# Patient Record
Sex: MALE | Race: WHITE | NOT HISPANIC OR LATINO | Employment: OTHER | URBAN - METROPOLITAN AREA
[De-identification: names, ages, dates, MRNs, and addresses within clinical notes are randomized per-mention and may not be internally consistent; named-entity substitution may affect disease eponyms.]

---

## 2023-01-01 ENCOUNTER — LAB (OUTPATIENT)
Dept: LAB | Facility: LAB | Age: 68
End: 2023-01-01
Payer: MEDICARE

## 2023-01-01 ENCOUNTER — TELEPHONE (OUTPATIENT)
Dept: PRIMARY CARE | Facility: CLINIC | Age: 68
End: 2023-01-01
Payer: MEDICARE

## 2023-01-01 ENCOUNTER — APPOINTMENT (OUTPATIENT)
Dept: RADIOLOGY | Facility: HOSPITAL | Age: 68
DRG: 698 | End: 2023-01-01
Payer: MEDICARE

## 2023-01-01 ENCOUNTER — TELEPHONE (OUTPATIENT)
Dept: SURGICAL ONCOLOGY | Facility: HOSPITAL | Age: 68
End: 2023-01-01
Payer: MEDICARE

## 2023-01-01 ENCOUNTER — APPOINTMENT (OUTPATIENT)
Dept: HEMATOLOGY/ONCOLOGY | Facility: CLINIC | Age: 68
End: 2023-01-01
Payer: MEDICARE

## 2023-01-01 ENCOUNTER — PATIENT OUTREACH (OUTPATIENT)
Dept: PRIMARY CARE | Facility: CLINIC | Age: 68
End: 2023-01-01
Payer: MEDICARE

## 2023-01-01 ENCOUNTER — OFFICE VISIT (OUTPATIENT)
Dept: PRIMARY CARE | Facility: CLINIC | Age: 68
End: 2023-01-01
Payer: MEDICARE

## 2023-01-01 ENCOUNTER — HOSPITAL ENCOUNTER (INPATIENT)
Facility: HOSPITAL | Age: 68
LOS: 5 days | DRG: 698 | End: 2023-12-07
Attending: EMERGENCY MEDICINE | Admitting: INTERNAL MEDICINE
Payer: MEDICARE

## 2023-01-01 ENCOUNTER — OFFICE VISIT (OUTPATIENT)
Dept: HEMATOLOGY/ONCOLOGY | Facility: CLINIC | Age: 68
End: 2023-01-01
Payer: MEDICARE

## 2023-01-01 ENCOUNTER — TELEPHONE (OUTPATIENT)
Dept: SCHEDULING | Age: 68
End: 2023-01-01
Payer: MEDICARE

## 2023-01-01 ENCOUNTER — TELEPHONE (OUTPATIENT)
Dept: HEMATOLOGY/ONCOLOGY | Facility: HOSPITAL | Age: 68
End: 2023-01-01
Payer: MEDICARE

## 2023-01-01 VITALS
HEART RATE: 68 BPM | DIASTOLIC BLOOD PRESSURE: 68 MMHG | BODY MASS INDEX: 37.02 KG/M2 | SYSTOLIC BLOOD PRESSURE: 112 MMHG | WEIGHT: 304 LBS | HEIGHT: 76 IN

## 2023-01-01 VITALS
OXYGEN SATURATION: 100 % | DIASTOLIC BLOOD PRESSURE: 55 MMHG | TEMPERATURE: 96.6 F | BODY MASS INDEX: 27.2 KG/M2 | SYSTOLIC BLOOD PRESSURE: 87 MMHG | WEIGHT: 223.33 LBS | HEIGHT: 76 IN | RESPIRATION RATE: 24 BRPM | HEART RATE: 98 BPM

## 2023-01-01 DIAGNOSIS — F32.A DEPRESSIVE DISORDER: ICD-10-CM

## 2023-01-01 DIAGNOSIS — C43.9 METASTATIC MELANOMA (MULTI): ICD-10-CM

## 2023-01-01 DIAGNOSIS — C77.9 IN-TRANSIT METASTASIS FROM MALIGNANT MELANOMA OF SKIN (MULTI): ICD-10-CM

## 2023-01-01 DIAGNOSIS — C77.9 MALIGNANT MELANOMA METASTATIC TO LYMPH NODE (MULTI): Primary | ICD-10-CM

## 2023-01-01 DIAGNOSIS — A41.9 SEPSIS WITHOUT ACUTE ORGAN DYSFUNCTION, DUE TO UNSPECIFIED ORGANISM (MULTI): ICD-10-CM

## 2023-01-01 DIAGNOSIS — E55.9 VITAMIN D DEFICIENCY: ICD-10-CM

## 2023-01-01 DIAGNOSIS — N18.32 HYPERTENSIVE HEART AND KIDNEY DISEASE WITH CHRONIC COMBINED SYSTOLIC AND DIASTOLIC CONGESTIVE HEART FAILURE AND STAGE 3B CHRONIC KIDNEY DISEASE (MULTI): ICD-10-CM

## 2023-01-01 DIAGNOSIS — C79.31 MELANOMA METASTATIC TO BRAIN (MULTI): ICD-10-CM

## 2023-01-01 DIAGNOSIS — I13.0 HYPERTENSIVE HEART AND KIDNEY DISEASE WITH CHRONIC COMBINED SYSTOLIC AND DIASTOLIC CONGESTIVE HEART FAILURE AND STAGE 3B CHRONIC KIDNEY DISEASE (MULTI): ICD-10-CM

## 2023-01-01 DIAGNOSIS — C43.9 IN-TRANSIT METASTASIS FROM MALIGNANT MELANOMA OF SKIN (MULTI): ICD-10-CM

## 2023-01-01 DIAGNOSIS — E11.65 TYPE 2 DIABETES MELLITUS WITH HYPERGLYCEMIA, WITH LONG-TERM CURRENT USE OF INSULIN (MULTI): ICD-10-CM

## 2023-01-01 DIAGNOSIS — I20.89 CHRONIC STABLE ANGINA (CMS-HCC): ICD-10-CM

## 2023-01-01 DIAGNOSIS — D51.0 VITAMIN B12 DEFICIENCY ANEMIA DUE TO INTRINSIC FACTOR DEFICIENCY: ICD-10-CM

## 2023-01-01 DIAGNOSIS — N39.0 URINARY TRACT INFECTION WITHOUT HEMATURIA, SITE UNSPECIFIED: Primary | ICD-10-CM

## 2023-01-01 DIAGNOSIS — Z79.4 TYPE 2 DIABETES MELLITUS WITH HYPERGLYCEMIA, WITH LONG-TERM CURRENT USE OF INSULIN (MULTI): ICD-10-CM

## 2023-01-01 DIAGNOSIS — I20.89 CHRONIC STABLE ANGINA (CMS-HCC): Primary | ICD-10-CM

## 2023-01-01 DIAGNOSIS — E78.2 MIXED HYPERLIPIDEMIA: ICD-10-CM

## 2023-01-01 DIAGNOSIS — E11.65 TYPE 2 DIABETES MELLITUS WITH HYPERGLYCEMIA, WITH LONG-TERM CURRENT USE OF INSULIN (MULTI): Primary | ICD-10-CM

## 2023-01-01 DIAGNOSIS — I50.42 HYPERTENSIVE HEART AND KIDNEY DISEASE WITH CHRONIC COMBINED SYSTOLIC AND DIASTOLIC CONGESTIVE HEART FAILURE AND STAGE 3B CHRONIC KIDNEY DISEASE (MULTI): ICD-10-CM

## 2023-01-01 DIAGNOSIS — Z79.4 TYPE 2 DIABETES MELLITUS WITH HYPERGLYCEMIA, WITH LONG-TERM CURRENT USE OF INSULIN (MULTI): Primary | ICD-10-CM

## 2023-01-01 DIAGNOSIS — I10 ESSENTIAL HYPERTENSION, BENIGN: ICD-10-CM

## 2023-01-01 LAB
ALBUMIN SERPL BCP-MCNC: 3.2 G/DL (ref 3.4–5)
ALP SERPL-CCNC: 539 U/L (ref 33–136)
ALT SERPL W P-5'-P-CCNC: 22 U/L (ref 10–52)
AMORPH CRY #/AREA UR COMP ASSIST: ABNORMAL /HPF
ANION GAP BLDMV CALCULATED.4IONS-SCNC: 16 MMO/L (ref 10–25)
ANION GAP SERPL CALC-SCNC: 15 MMOL/L (ref 10–20)
ANION GAP SERPL CALC-SCNC: 16 MMOL/L (ref 10–20)
ANION GAP SERPL CALC-SCNC: 17 MMOL/L (ref 10–20)
APPEARANCE UR: ABNORMAL
APPEARANCE UR: ABNORMAL
APTT PPP: 28 SECONDS (ref 27–38)
AST SERPL W P-5'-P-CCNC: 23 U/L (ref 9–39)
BACTERIA #/AREA URNS AUTO: ABNORMAL /HPF
BACTERIA #/AREA URNS AUTO: ABNORMAL /HPF
BACTERIA BLD CULT: NORMAL
BACTERIA BLD CULT: NORMAL
BACTERIA UR CULT: NO GROWTH
BACTERIA UR CULT: NO GROWTH
BASE EXCESS BLDMV CALC-SCNC: -3.2 MMOL/L (ref -2–3)
BASOPHILS # BLD AUTO: 0.02 X10*3/UL (ref 0–0.1)
BASOPHILS NFR BLD AUTO: 0.3 %
BILIRUB SERPL-MCNC: 1 MG/DL (ref 0–1.2)
BILIRUB UR STRIP.AUTO-MCNC: ABNORMAL MG/DL
BILIRUB UR STRIP.AUTO-MCNC: ABNORMAL MG/DL
BODY TEMPERATURE: 37 DEGREES CELSIUS
BUN SERPL-MCNC: 32 MG/DL (ref 6–23)
BUN SERPL-MCNC: 41 MG/DL (ref 6–23)
BUN SERPL-MCNC: 54 MG/DL (ref 6–23)
CA-I BLDMV-SCNC: 1.16 MMOL/L (ref 1.1–1.33)
CALCIDIOL (25 OH VITAMIN D3) (NG/ML) IN SER/PLAS: 25 NG/ML
CALCIUM SERPL-MCNC: 7.5 MG/DL (ref 8.6–10.3)
CALCIUM SERPL-MCNC: 7.7 MG/DL (ref 8.6–10.3)
CALCIUM SERPL-MCNC: 8.1 MG/DL (ref 8.6–10.3)
CARDIAC TROPONIN I PNL SERPL HS: 22 NG/L (ref 0–20)
CARDIAC TROPONIN I PNL SERPL HS: 24 NG/L (ref 0–20)
CHLORIDE BLD-SCNC: 110 MMOL/L (ref 98–107)
CHLORIDE SERPL-SCNC: 111 MMOL/L (ref 98–107)
CHLORIDE SERPL-SCNC: 115 MMOL/L (ref 98–107)
CHLORIDE SERPL-SCNC: 115 MMOL/L (ref 98–107)
CHOLESTEROL (MG/DL) IN SER/PLAS: 118 MG/DL (ref 0–199)
CHOLESTEROL IN HDL (MG/DL) IN SER/PLAS: 47.7 MG/DL
CHOLESTEROL/HDL RATIO: 2.5
CO2 SERPL-SCNC: 17 MMOL/L (ref 21–32)
CO2 SERPL-SCNC: 18 MMOL/L (ref 21–32)
CO2 SERPL-SCNC: 20 MMOL/L (ref 21–32)
COBALAMIN (VITAMIN B12) (PG/ML) IN SER/PLAS: 851 PG/ML (ref 211–911)
COLOR UR: ABNORMAL
COLOR UR: ABNORMAL
CREAT SERPL-MCNC: 1.12 MG/DL (ref 0.5–1.3)
CREAT SERPL-MCNC: 1.12 MG/DL (ref 0.5–1.3)
CREAT SERPL-MCNC: 1.15 MG/DL (ref 0.5–1.3)
CREAT SERPL-MCNC: 2 MG/DL (ref 0.5–1.3)
DACRYOCYTES BLD QL SMEAR: NORMAL
EOSINOPHIL # BLD AUTO: 0.03 X10*3/UL (ref 0–0.7)
EOSINOPHIL NFR BLD AUTO: 0.4 %
ERYTHROCYTE [DISTWIDTH] IN BLOOD BY AUTOMATED COUNT: 22.8 % (ref 11.5–14.5)
ERYTHROCYTE [DISTWIDTH] IN BLOOD BY AUTOMATED COUNT: 23.5 % (ref 11.5–14.5)
ERYTHROCYTE [DISTWIDTH] IN BLOOD BY AUTOMATED COUNT: 23.6 % (ref 11.5–14.5)
ESTIMATED AVERAGE GLUCOSE FOR HBA1C: 180 MG/DL
GFR SERPL CREATININE-BSD FRML MDRD: 35 ML/MIN/1.73M*2
GFR SERPL CREATININE-BSD FRML MDRD: 68 ML/MIN/1.73M*2
GFR SERPL CREATININE-BSD FRML MDRD: 71 ML/MIN/1.73M*2
GFR SERPL CREATININE-BSD FRML MDRD: 71 ML/MIN/1.73M*2
GLUCOSE BLD MANUAL STRIP-MCNC: 249 MG/DL (ref 74–99)
GLUCOSE BLD MANUAL STRIP-MCNC: 263 MG/DL (ref 74–99)
GLUCOSE BLD MANUAL STRIP-MCNC: 265 MG/DL (ref 74–99)
GLUCOSE BLD MANUAL STRIP-MCNC: 268 MG/DL (ref 74–99)
GLUCOSE BLD MANUAL STRIP-MCNC: 277 MG/DL (ref 74–99)
GLUCOSE BLD MANUAL STRIP-MCNC: 279 MG/DL (ref 74–99)
GLUCOSE BLD MANUAL STRIP-MCNC: 283 MG/DL (ref 74–99)
GLUCOSE BLD MANUAL STRIP-MCNC: 289 MG/DL (ref 74–99)
GLUCOSE BLD MANUAL STRIP-MCNC: 304 MG/DL (ref 74–99)
GLUCOSE BLD MANUAL STRIP-MCNC: 305 MG/DL (ref 74–99)
GLUCOSE BLD MANUAL STRIP-MCNC: 325 MG/DL (ref 74–99)
GLUCOSE BLD MANUAL STRIP-MCNC: 326 MG/DL (ref 74–99)
GLUCOSE BLD MANUAL STRIP-MCNC: 333 MG/DL (ref 74–99)
GLUCOSE BLD MANUAL STRIP-MCNC: 340 MG/DL (ref 74–99)
GLUCOSE BLD-MCNC: 301 MG/DL (ref 74–99)
GLUCOSE SERPL-MCNC: 264 MG/DL (ref 74–99)
GLUCOSE SERPL-MCNC: 275 MG/DL (ref 74–99)
GLUCOSE SERPL-MCNC: 369 MG/DL (ref 74–99)
GLUCOSE UR STRIP.AUTO-MCNC: ABNORMAL MG/DL
GLUCOSE UR STRIP.AUTO-MCNC: ABNORMAL MG/DL
HCO3 BLDMV-SCNC: 20.6 MMOL/L (ref 22–26)
HCT VFR BLD AUTO: 24.7 % (ref 41–52)
HCT VFR BLD AUTO: 26.6 % (ref 41–52)
HCT VFR BLD AUTO: 29 % (ref 41–52)
HCT VFR BLD EST: 26 % (ref 41–52)
HEMOGLOBIN A1C/HEMOGLOBIN TOTAL IN BLOOD: 7.9 %
HGB BLD-MCNC: 7.4 G/DL (ref 13.5–17.5)
HGB BLD-MCNC: 8 G/DL (ref 13.5–17.5)
HGB BLD-MCNC: 8.9 G/DL (ref 13.5–17.5)
HGB BLDMV-MCNC: 8.5 G/DL (ref 13.5–17.5)
HOLD SPECIMEN: NORMAL
HOLD SPECIMEN: NORMAL
HYALINE CASTS #/AREA URNS AUTO: ABNORMAL /LPF
HYALINE CASTS #/AREA URNS AUTO: ABNORMAL /LPF
HYPOCHROMIA BLD QL SMEAR: NORMAL
IMM GRANULOCYTES # BLD AUTO: 0.36 X10*3/UL (ref 0–0.7)
IMM GRANULOCYTES NFR BLD AUTO: 5 % (ref 0–0.9)
INHALED O2 CONCENTRATION: 24 %
INR PPP: 1.1 (ref 0.9–1.1)
KETONES UR STRIP.AUTO-MCNC: ABNORMAL MG/DL
KETONES UR STRIP.AUTO-MCNC: ABNORMAL MG/DL
LACTATE BLDMV-SCNC: 2.5 MMOL/L (ref 0.4–2)
LACTATE BLDV-SCNC: 1.8 MMOL/L (ref 0.4–2)
LACTATE SERPL-SCNC: 1.6 MMOL/L (ref 0.4–2)
LACTATE SERPL-SCNC: 2.2 MMOL/L (ref 0.4–2)
LDL: 33 MG/DL (ref 0–99)
LEUKOCYTE ESTERASE UR QL STRIP.AUTO: ABNORMAL
LEUKOCYTE ESTERASE UR QL STRIP.AUTO: ABNORMAL
LYMPHOCYTES # BLD AUTO: 1.12 X10*3/UL (ref 1.2–4.8)
LYMPHOCYTES NFR BLD AUTO: 15.5 %
MAGNESIUM SERPL-MCNC: 1.53 MG/DL (ref 1.6–2.4)
MAGNESIUM SERPL-MCNC: 1.76 MG/DL (ref 1.6–2.4)
MCH RBC QN AUTO: 34 PG (ref 26–34)
MCH RBC QN AUTO: 34.4 PG (ref 26–34)
MCH RBC QN AUTO: 35.1 PG (ref 26–34)
MCHC RBC AUTO-ENTMCNC: 30 G/DL (ref 32–36)
MCHC RBC AUTO-ENTMCNC: 30.1 G/DL (ref 32–36)
MCHC RBC AUTO-ENTMCNC: 30.7 G/DL (ref 32–36)
MCV RBC AUTO: 111 FL (ref 80–100)
MCV RBC AUTO: 115 FL (ref 80–100)
MCV RBC AUTO: 117 FL (ref 80–100)
MONOCYTES # BLD AUTO: 0.78 X10*3/UL (ref 0.1–1)
MONOCYTES NFR BLD AUTO: 10.8 %
MUCOUS THREADS #/AREA URNS AUTO: ABNORMAL /LPF
MUCOUS THREADS #/AREA URNS AUTO: ABNORMAL /LPF
NEUTROPHILS # BLD AUTO: 4.93 X10*3/UL (ref 1.2–7.7)
NEUTROPHILS NFR BLD AUTO: 68 %
NITRITE UR QL STRIP.AUTO: NEGATIVE
NITRITE UR QL STRIP.AUTO: NEGATIVE
NRBC BLD-RTO: 0 /100 WBCS (ref 0–0)
NRBC BLD-RTO: 0.2 /100 WBCS (ref 0–0)
NRBC BLD-RTO: 0.3 /100 WBCS (ref 0–0)
OXYHGB MFR BLDMV: 46.7 % (ref 45–75)
PATH REVIEW-CBC DIFFERENTIAL: NORMAL
PCO2 BLDMV: 31 MM HG (ref 41–51)
PH BLDMV: 7.43 PH (ref 7.33–7.43)
PH UR STRIP.AUTO: 5 [PH]
PH UR STRIP.AUTO: 5.5 [PH]
PLATELET # BLD AUTO: 40 X10*3/UL (ref 150–450)
PLATELET # BLD AUTO: 42 X10*3/UL (ref 150–450)
PLATELET # BLD AUTO: 46 X10*3/UL (ref 150–450)
PO2 BLDMV: 38 MM HG (ref 35–45)
POTASSIUM BLDMV-SCNC: 4.3 MMOL/L (ref 3.5–5.3)
POTASSIUM SERPL-SCNC: 4.2 MMOL/L (ref 3.5–5.3)
POTASSIUM SERPL-SCNC: 4.5 MMOL/L (ref 3.5–5.3)
POTASSIUM SERPL-SCNC: 4.9 MMOL/L (ref 3.5–5.3)
PROT SERPL-MCNC: 5.2 G/DL (ref 6.4–8.2)
PROT UR STRIP.AUTO-MCNC: ABNORMAL MG/DL
PROT UR STRIP.AUTO-MCNC: ABNORMAL MG/DL
PROTHROMBIN TIME: 12.7 SECONDS (ref 9.8–12.8)
RBC # BLD AUTO: 2.15 X10*6/UL (ref 4.5–5.9)
RBC # BLD AUTO: 2.28 X10*6/UL (ref 4.5–5.9)
RBC # BLD AUTO: 2.62 X10*6/UL (ref 4.5–5.9)
RBC # UR STRIP.AUTO: ABNORMAL /UL
RBC # UR STRIP.AUTO: ABNORMAL /UL
RBC #/AREA URNS AUTO: >20 /HPF
RBC #/AREA URNS AUTO: ABNORMAL /HPF
RBC MORPH BLD: NORMAL
SAO2 % BLDMV: 48 % (ref 45–75)
SODIUM BLDMV-SCNC: 142 MMOL/L (ref 136–145)
SODIUM SERPL-SCNC: 142 MMOL/L (ref 136–145)
SODIUM SERPL-SCNC: 144 MMOL/L (ref 136–145)
SODIUM SERPL-SCNC: 144 MMOL/L (ref 136–145)
SP GR UR STRIP.AUTO: 1.02
SP GR UR STRIP.AUTO: >1.03
SQUAMOUS #/AREA URNS AUTO: ABNORMAL /HPF
TRIGLYCERIDE (MG/DL) IN SER/PLAS: 187 MG/DL (ref 0–149)
URATE CRY #/AREA UR COMP ASSIST: ABNORMAL /HPF
UROBILINOGEN UR STRIP.AUTO-MCNC: 4 MG/DL
UROBILINOGEN UR STRIP.AUTO-MCNC: ABNORMAL MG/DL
VLDL: 37 MG/DL (ref 0–40)
WBC # BLD AUTO: 7.2 X10*3/UL (ref 4.4–11.3)
WBC # BLD AUTO: 7.4 X10*3/UL (ref 4.4–11.3)
WBC # BLD AUTO: 9 X10*3/UL (ref 4.4–11.3)
WBC #/AREA URNS AUTO: ABNORMAL /HPF
WBC #/AREA URNS AUTO: ABNORMAL /HPF
WBC CLUMPS #/AREA URNS AUTO: ABNORMAL /HPF

## 2023-01-01 PROCEDURE — 94660 CPAP INITIATION&MGMT: CPT

## 2023-01-01 PROCEDURE — 2500000004 HC RX 250 GENERAL PHARMACY W/ HCPCS (ALT 636 FOR OP/ED): Performed by: EMERGENCY MEDICINE

## 2023-01-01 PROCEDURE — 87086 URINE CULTURE/COLONY COUNT: CPT | Mod: PORLAB | Performed by: EMERGENCY MEDICINE

## 2023-01-01 PROCEDURE — 82947 ASSAY GLUCOSE BLOOD QUANT: CPT

## 2023-01-01 PROCEDURE — 3066F NEPHROPATHY DOC TX: CPT | Performed by: INTERNAL MEDICINE

## 2023-01-01 PROCEDURE — 85025 COMPLETE CBC W/AUTO DIFF WBC: CPT | Performed by: EMERGENCY MEDICINE

## 2023-01-01 PROCEDURE — 2500000005 HC RX 250 GENERAL PHARMACY W/O HCPCS: Performed by: INTERNAL MEDICINE

## 2023-01-01 PROCEDURE — 99239 HOSP IP/OBS DSCHRG MGMT >30: CPT | Performed by: INTERNAL MEDICINE

## 2023-01-01 PROCEDURE — 1160F RVW MEDS BY RX/DR IN RCRD: CPT | Performed by: INTERNAL MEDICINE

## 2023-01-01 PROCEDURE — 83036 HEMOGLOBIN GLYCOSYLATED A1C: CPT

## 2023-01-01 PROCEDURE — 2500000004 HC RX 250 GENERAL PHARMACY W/ HCPCS (ALT 636 FOR OP/ED): Performed by: INTERNAL MEDICINE

## 2023-01-01 PROCEDURE — 99214 OFFICE O/P EST MOD 30 MIN: CPT | Performed by: INTERNAL MEDICINE

## 2023-01-01 PROCEDURE — 85060 BLOOD SMEAR INTERPRETATION: CPT | Performed by: EMERGENCY MEDICINE

## 2023-01-01 PROCEDURE — 82306 VITAMIN D 25 HYDROXY: CPT

## 2023-01-01 PROCEDURE — 96372 THER/PROPH/DIAG INJ SC/IM: CPT | Performed by: INTERNAL MEDICINE

## 2023-01-01 PROCEDURE — 96372 THER/PROPH/DIAG INJ SC/IM: CPT | Mod: 25

## 2023-01-01 PROCEDURE — 81001 URINALYSIS AUTO W/SCOPE: CPT | Performed by: EMERGENCY MEDICINE

## 2023-01-01 PROCEDURE — 3008F BODY MASS INDEX DOCD: CPT | Performed by: INTERNAL MEDICINE

## 2023-01-01 PROCEDURE — 74177 CT ABD & PELVIS W/CONTRAST: CPT | Mod: FR

## 2023-01-01 PROCEDURE — 3078F DIAST BP <80 MM HG: CPT | Performed by: INTERNAL MEDICINE

## 2023-01-01 PROCEDURE — 1159F MED LIST DOCD IN RCRD: CPT | Performed by: INTERNAL MEDICINE

## 2023-01-01 PROCEDURE — 94760 N-INVAS EAR/PLS OXIMETRY 1: CPT

## 2023-01-01 PROCEDURE — 99233 SBSQ HOSP IP/OBS HIGH 50: CPT | Performed by: UROLOGY

## 2023-01-01 PROCEDURE — 99233 SBSQ HOSP IP/OBS HIGH 50: CPT | Performed by: INTERNAL MEDICINE

## 2023-01-01 PROCEDURE — 82330 ASSAY OF CALCIUM: CPT | Performed by: EMERGENCY MEDICINE

## 2023-01-01 PROCEDURE — 84484 ASSAY OF TROPONIN QUANT: CPT | Performed by: EMERGENCY MEDICINE

## 2023-01-01 PROCEDURE — 83735 ASSAY OF MAGNESIUM: CPT | Performed by: INTERNAL MEDICINE

## 2023-01-01 PROCEDURE — 96375 TX/PRO/DX INJ NEW DRUG ADDON: CPT

## 2023-01-01 PROCEDURE — 87040 BLOOD CULTURE FOR BACTERIA: CPT | Mod: PORLAB | Performed by: EMERGENCY MEDICINE

## 2023-01-01 PROCEDURE — 85027 COMPLETE CBC AUTOMATED: CPT | Performed by: INTERNAL MEDICINE

## 2023-01-01 PROCEDURE — 2060000001 HC INTERMEDIATE ICU ROOM DAILY

## 2023-01-01 PROCEDURE — 83605 ASSAY OF LACTIC ACID: CPT | Performed by: EMERGENCY MEDICINE

## 2023-01-01 PROCEDURE — 96365 THER/PROPH/DIAG IV INF INIT: CPT

## 2023-01-01 PROCEDURE — 99285 EMERGENCY DEPT VISIT HI MDM: CPT | Mod: 25 | Performed by: EMERGENCY MEDICINE

## 2023-01-01 PROCEDURE — 85610 PROTHROMBIN TIME: CPT | Performed by: INTERNAL MEDICINE

## 2023-01-01 PROCEDURE — 82565 ASSAY OF CREATININE: CPT | Performed by: EMERGENCY MEDICINE

## 2023-01-01 PROCEDURE — 71275 CT ANGIOGRAPHY CHEST: CPT | Mod: FOREIGN READ | Performed by: RADIOLOGY

## 2023-01-01 PROCEDURE — 99223 1ST HOSP IP/OBS HIGH 75: CPT | Performed by: INTERNAL MEDICINE

## 2023-01-01 PROCEDURE — 99222 1ST HOSP IP/OBS MODERATE 55: CPT | Performed by: INTERNAL MEDICINE

## 2023-01-01 PROCEDURE — 1036F TOBACCO NON-USER: CPT | Performed by: INTERNAL MEDICINE

## 2023-01-01 PROCEDURE — 2550000001 HC RX 255 CONTRASTS: Performed by: EMERGENCY MEDICINE

## 2023-01-01 PROCEDURE — 82607 VITAMIN B-12: CPT

## 2023-01-01 PROCEDURE — 36415 COLL VENOUS BLD VENIPUNCTURE: CPT

## 2023-01-01 PROCEDURE — 1158F ADVNC CARE PLAN TLK DOCD: CPT | Performed by: INTERNAL MEDICINE

## 2023-01-01 PROCEDURE — 80061 LIPID PANEL: CPT

## 2023-01-01 PROCEDURE — 2500000001 HC RX 250 WO HCPCS SELF ADMINISTERED DRUGS (ALT 637 FOR MEDICARE OP): Performed by: INTERNAL MEDICINE

## 2023-01-01 PROCEDURE — 2500000001 HC RX 250 WO HCPCS SELF ADMINISTERED DRUGS (ALT 637 FOR MEDICARE OP): Performed by: EMERGENCY MEDICINE

## 2023-01-01 PROCEDURE — 80048 BASIC METABOLIC PNL TOTAL CA: CPT | Performed by: INTERNAL MEDICINE

## 2023-01-01 PROCEDURE — 74177 CT ABD & PELVIS W/CONTRAST: CPT | Mod: FOREIGN READ | Performed by: RADIOLOGY

## 2023-01-01 PROCEDURE — 1125F AMNT PAIN NOTED PAIN PRSNT: CPT | Performed by: INTERNAL MEDICINE

## 2023-01-01 PROCEDURE — 96366 THER/PROPH/DIAG IV INF ADDON: CPT

## 2023-01-01 PROCEDURE — 92610 EVALUATE SWALLOWING FUNCTION: CPT | Mod: GN | Performed by: SPEECH-LANGUAGE PATHOLOGIST

## 2023-01-01 PROCEDURE — 3074F SYST BP LT 130 MM HG: CPT | Performed by: INTERNAL MEDICINE

## 2023-01-01 PROCEDURE — 36415 COLL VENOUS BLD VENIPUNCTURE: CPT | Performed by: EMERGENCY MEDICINE

## 2023-01-01 PROCEDURE — 71045 X-RAY EXAM CHEST 1 VIEW: CPT | Mod: FY,FR

## 2023-01-01 PROCEDURE — 71275 CT ANGIOGRAPHY CHEST: CPT | Mod: FR

## 2023-01-01 PROCEDURE — 99443 PR PHYS/QHP TELEPHONE EVALUATION 21-30 MIN: CPT | Performed by: INTERNAL MEDICINE

## 2023-01-01 PROCEDURE — 2500000002 HC RX 250 W HCPCS SELF ADMINISTERED DRUGS (ALT 637 FOR MEDICARE OP, ALT 636 FOR OP/ED): Performed by: INTERNAL MEDICINE

## 2023-01-01 PROCEDURE — 71045 X-RAY EXAM CHEST 1 VIEW: CPT | Mod: FOREIGN READ | Performed by: RADIOLOGY

## 2023-01-01 PROCEDURE — 84132 ASSAY OF SERUM POTASSIUM: CPT | Performed by: EMERGENCY MEDICINE

## 2023-01-01 RX ORDER — LORAZEPAM 2 MG/ML
2 INJECTION INTRAMUSCULAR EVERY 10 MIN PRN
Status: COMPLETED | OUTPATIENT
Start: 2023-01-01 | End: 2023-01-01

## 2023-01-01 RX ORDER — TRIAMCINOLONE ACETONIDE 1 MG/G
1 CREAM TOPICAL EVERY 4 HOURS PRN
COMMUNITY

## 2023-01-01 RX ORDER — HYDROXYZINE HYDROCHLORIDE 10 MG/1
10 TABLET, FILM COATED ORAL EVERY 8 HOURS PRN
COMMUNITY
End: 2023-12-12

## 2023-01-01 RX ORDER — BINIMETINIB 15 MG/1
TABLET, FILM COATED ORAL
COMMUNITY
Start: 2022-04-09 | End: 2023-01-01 | Stop reason: ALTCHOICE

## 2023-01-01 RX ORDER — PEN NEEDLE, DIABETIC 32GX 5/32"
NEEDLE, DISPOSABLE MISCELLANEOUS
COMMUNITY
Start: 2023-01-01 | End: 2023-01-01 | Stop reason: ALTCHOICE

## 2023-01-01 RX ORDER — LEVETIRACETAM 500 MG/1
500 TABLET ORAL 2 TIMES DAILY
COMMUNITY

## 2023-01-01 RX ORDER — DULOXETIN HYDROCHLORIDE 30 MG/1
CAPSULE, DELAYED RELEASE ORAL
Qty: 90 CAPSULE | Refills: 0 | Status: SHIPPED | OUTPATIENT
Start: 2023-01-01 | End: 2023-01-01

## 2023-01-01 RX ORDER — FOLIC ACID 1 MG/1
1 TABLET ORAL EVERY MORNING
COMMUNITY
Start: 2021-07-27

## 2023-01-01 RX ORDER — LANOLIN ALCOHOL/MO/W.PET/CERES
1 CREAM (GRAM) TOPICAL DAILY
COMMUNITY
End: 2023-01-01 | Stop reason: ALTCHOICE

## 2023-01-01 RX ORDER — ONDANSETRON HYDROCHLORIDE 2 MG/ML
4 INJECTION, SOLUTION INTRAVENOUS EVERY 8 HOURS PRN
Status: DISCONTINUED | OUTPATIENT
Start: 2023-01-01 | End: 2023-01-01

## 2023-01-01 RX ORDER — ISOSORBIDE MONONITRATE 30 MG/1
1 TABLET, EXTENDED RELEASE ORAL
COMMUNITY
Start: 2022-02-09 | End: 2023-01-01 | Stop reason: SDUPTHER

## 2023-01-01 RX ORDER — LORAZEPAM 1 MG/1
TABLET ORAL
COMMUNITY
Start: 2023-01-01 | End: 2023-01-01 | Stop reason: ALTCHOICE

## 2023-01-01 RX ORDER — LANOLIN ALCOHOL/MO/W.PET/CERES
400 CREAM (GRAM) TOPICAL DAILY
Status: DISCONTINUED | OUTPATIENT
Start: 2023-01-01 | End: 2023-01-01

## 2023-01-01 RX ORDER — BUPRENORPHINE 20 UG/H
1 PATCH TRANSDERMAL
COMMUNITY

## 2023-01-01 RX ORDER — DULOXETIN HYDROCHLORIDE 60 MG/1
1 CAPSULE, DELAYED RELEASE ORAL DAILY
COMMUNITY
Start: 2020-02-17 | End: 2023-01-01 | Stop reason: ALTCHOICE

## 2023-01-01 RX ORDER — CARVEDILOL 6.25 MG/1
6.25 TABLET ORAL 2 TIMES DAILY
COMMUNITY
Start: 2021-05-07

## 2023-01-01 RX ORDER — LANCETS 28 GAUGE
EACH MISCELLANEOUS
COMMUNITY
Start: 2022-09-27 | End: 2023-01-01 | Stop reason: ALTCHOICE

## 2023-01-01 RX ORDER — LORAZEPAM 2 MG/ML
0.5 INJECTION INTRAMUSCULAR EVERY 4 HOURS PRN
Status: DISCONTINUED | OUTPATIENT
Start: 2023-01-01 | End: 2023-01-01 | Stop reason: HOSPADM

## 2023-01-01 RX ORDER — INSULIN LISPRO 100 [IU]/ML
INJECTION, SOLUTION INTRAVENOUS; SUBCUTANEOUS
COMMUNITY
Start: 2022-02-09 | End: 2023-01-01 | Stop reason: ALTCHOICE

## 2023-01-01 RX ORDER — BUPRENORPHINE 20 UG/H
1 PATCH TRANSDERMAL
Status: DISCONTINUED | OUTPATIENT
Start: 2023-01-01 | End: 2023-01-01

## 2023-01-01 RX ORDER — FUROSEMIDE 20 MG/1
1 TABLET ORAL DAILY
COMMUNITY
Start: 2023-01-01 | End: 2023-01-01 | Stop reason: ALTCHOICE

## 2023-01-01 RX ORDER — AMLODIPINE BESYLATE 10 MG/1
1 TABLET ORAL DAILY
COMMUNITY
Start: 2022-02-09 | End: 2023-01-01

## 2023-01-01 RX ORDER — IBUPROFEN 600 MG/1
1 TABLET ORAL EVERY 8 HOURS PRN
COMMUNITY
Start: 2022-10-14

## 2023-01-01 RX ORDER — MORPHINE SULFATE 4 MG/ML
4 INJECTION INTRAVENOUS EVERY 4 HOURS PRN
Status: DISCONTINUED | OUTPATIENT
Start: 2023-01-01 | End: 2023-01-01

## 2023-01-01 RX ORDER — NALOXONE HYDROCHLORIDE 0.4 MG/ML
0.2 INJECTION, SOLUTION INTRAMUSCULAR; INTRAVENOUS; SUBCUTANEOUS EVERY 5 MIN PRN
Status: DISCONTINUED | OUTPATIENT
Start: 2023-01-01 | End: 2023-01-01

## 2023-01-01 RX ORDER — SODIUM CHLORIDE 9 MG/ML
75 INJECTION, SOLUTION INTRAVENOUS CONTINUOUS
Status: DISCONTINUED | OUTPATIENT
Start: 2023-01-01 | End: 2023-01-01

## 2023-01-01 RX ORDER — AMLODIPINE BESYLATE 10 MG/1
TABLET ORAL
Qty: 90 TABLET | Refills: 3 | Status: SHIPPED | OUTPATIENT
Start: 2023-01-01 | End: 2023-01-01

## 2023-01-01 RX ORDER — MORPHINE SULFATE 4 MG/ML
4 INJECTION, SOLUTION INTRAMUSCULAR; INTRAVENOUS ONCE
Status: COMPLETED | OUTPATIENT
Start: 2023-01-01 | End: 2023-01-01

## 2023-01-01 RX ORDER — FOLIC ACID 1 MG/1
1 TABLET ORAL EVERY MORNING
Status: DISCONTINUED | OUTPATIENT
Start: 2023-01-01 | End: 2023-01-01

## 2023-01-01 RX ORDER — ISOSORBIDE MONONITRATE 30 MG/1
30 TABLET, EXTENDED RELEASE ORAL
Status: DISCONTINUED | OUTPATIENT
Start: 2023-01-01 | End: 2023-01-01

## 2023-01-01 RX ORDER — SODIUM BICARBONATE 650 MG/1
650 TABLET ORAL 3 TIMES DAILY
COMMUNITY

## 2023-01-01 RX ORDER — DEXAMETHASONE 2 MG/1
2 TABLET ORAL 2 TIMES DAILY
COMMUNITY
End: 2023-12-12

## 2023-01-01 RX ORDER — ZINC GLUCONATE 50 MG
1 TABLET ORAL DAILY
COMMUNITY
End: 2023-01-01 | Stop reason: ALTCHOICE

## 2023-01-01 RX ORDER — ATORVASTATIN CALCIUM 20 MG/1
1 TABLET, FILM COATED ORAL NIGHTLY
COMMUNITY
Start: 2019-10-17 | End: 2023-01-01 | Stop reason: ALTCHOICE

## 2023-01-01 RX ORDER — ISOSORBIDE MONONITRATE 30 MG/1
30 TABLET, EXTENDED RELEASE ORAL
Qty: 90 TABLET | Refills: 3 | Status: SHIPPED | OUTPATIENT
Start: 2023-01-01 | End: 2023-01-01

## 2023-01-01 RX ORDER — CARVEDILOL 6.25 MG/1
6.25 TABLET ORAL 2 TIMES DAILY
Status: DISCONTINUED | OUTPATIENT
Start: 2023-01-01 | End: 2023-01-01

## 2023-01-01 RX ORDER — MEROPENEM 1 G/1
1 INJECTION, POWDER, FOR SOLUTION INTRAVENOUS EVERY 12 HOURS
Status: DISCONTINUED | OUTPATIENT
Start: 2023-01-01 | End: 2023-01-01

## 2023-01-01 RX ORDER — MEROPENEM 1 G/1
1 INJECTION, POWDER, FOR SOLUTION INTRAVENOUS EVERY 8 HOURS
Status: DISCONTINUED | OUTPATIENT
Start: 2023-01-01 | End: 2023-01-01 | Stop reason: SDUPTHER

## 2023-01-01 RX ORDER — HYDROXYZINE HYDROCHLORIDE 10 MG/1
10 TABLET, FILM COATED ORAL EVERY 8 HOURS PRN
Status: DISCONTINUED | OUTPATIENT
Start: 2023-01-01 | End: 2023-01-01

## 2023-01-01 RX ORDER — ALPRAZOLAM 1 MG/1
TABLET ORAL
COMMUNITY
Start: 2022-09-14 | End: 2023-01-01 | Stop reason: ALTCHOICE

## 2023-01-01 RX ORDER — ACYCLOVIR 800 MG/1
800 TABLET ORAL 2 TIMES DAILY
COMMUNITY

## 2023-01-01 RX ORDER — GLYCOPYRROLATE 0.2 MG/ML
0.2 INJECTION INTRAMUSCULAR; INTRAVENOUS EVERY 4 HOURS PRN
Status: DISCONTINUED | OUTPATIENT
Start: 2023-01-01 | End: 2023-01-01 | Stop reason: HOSPADM

## 2023-01-01 RX ORDER — HALOPERIDOL 2 MG/1
2 TABLET ORAL EVERY 8 HOURS PRN
COMMUNITY
End: 2023-12-12

## 2023-01-01 RX ORDER — DIPHENHYDRAMINE HCL 25 MG
25 CAPSULE ORAL EVERY 6 HOURS PRN
Status: DISCONTINUED | OUTPATIENT
Start: 2023-01-01 | End: 2023-01-01

## 2023-01-01 RX ORDER — MEROPENEM 1 G/1
1 INJECTION, POWDER, FOR SOLUTION INTRAVENOUS EVERY 8 HOURS
COMMUNITY
End: 2023-01-01

## 2023-01-01 RX ORDER — CHOLECALCIFEROL (VITAMIN D3) 25 MCG
1000 TABLET ORAL DAILY
Status: DISCONTINUED | OUTPATIENT
Start: 2023-01-01 | End: 2023-01-01

## 2023-01-01 RX ORDER — PROCHLORPERAZINE MALEATE 10 MG
1 TABLET ORAL EVERY 6 HOURS
COMMUNITY
Start: 2023-01-01 | End: 2023-01-01 | Stop reason: ALTCHOICE

## 2023-01-01 RX ORDER — ASPIRIN 81 MG/1
1 TABLET ORAL DAILY
COMMUNITY
End: 2023-01-01 | Stop reason: ALTCHOICE

## 2023-01-01 RX ORDER — ACETAMINOPHEN 650 MG/1
650 SUPPOSITORY RECTAL EVERY 4 HOURS PRN
Status: DISCONTINUED | OUTPATIENT
Start: 2023-01-01 | End: 2023-01-01 | Stop reason: HOSPADM

## 2023-01-01 RX ORDER — SODIUM BICARBONATE 325 MG/1
650 TABLET ORAL 3 TIMES DAILY
Status: DISCONTINUED | OUTPATIENT
Start: 2023-01-01 | End: 2023-01-01

## 2023-01-01 RX ORDER — TALC
6 POWDER (GRAM) TOPICAL NIGHTLY
Status: DISCONTINUED | OUTPATIENT
Start: 2023-01-01 | End: 2023-01-01

## 2023-01-01 RX ORDER — MEROPENEM 1 G/1
1 INJECTION, POWDER, FOR SOLUTION INTRAVENOUS EVERY 8 HOURS
Status: DISCONTINUED | OUTPATIENT
Start: 2023-01-01 | End: 2023-01-01

## 2023-01-01 RX ORDER — METOPROLOL TARTRATE 1 MG/ML
5 INJECTION, SOLUTION INTRAVENOUS EVERY 6 HOURS PRN
Status: DISCONTINUED | OUTPATIENT
Start: 2023-01-01 | End: 2023-01-01

## 2023-01-01 RX ORDER — INSULIN LISPRO 100 [IU]/ML
0-10 INJECTION, SOLUTION INTRAVENOUS; SUBCUTANEOUS
Status: DISCONTINUED | OUTPATIENT
Start: 2023-01-01 | End: 2023-01-01

## 2023-01-01 RX ORDER — TALC
6 POWDER (GRAM) TOPICAL NIGHTLY
COMMUNITY

## 2023-01-01 RX ORDER — ONDANSETRON HYDROCHLORIDE 8 MG/1
8 TABLET, FILM COATED ORAL EVERY 8 HOURS PRN
COMMUNITY
End: 2023-01-01 | Stop reason: ALTCHOICE

## 2023-01-01 RX ORDER — LANOLIN ALCOHOL/MO/W.PET/CERES
CREAM (GRAM) TOPICAL
Qty: 90 TABLET | Refills: 0 | Status: SHIPPED | OUTPATIENT
Start: 2023-01-01 | End: 2023-01-01

## 2023-01-01 RX ORDER — LEVETIRACETAM 500 MG/1
500 TABLET ORAL 2 TIMES DAILY
Status: DISCONTINUED | OUTPATIENT
Start: 2023-01-01 | End: 2023-01-01

## 2023-01-01 RX ORDER — ONDANSETRON HYDROCHLORIDE 2 MG/ML
4 INJECTION, SOLUTION INTRAVENOUS EVERY 4 HOURS PRN
Status: DISCONTINUED | OUTPATIENT
Start: 2023-01-01 | End: 2023-01-01 | Stop reason: HOSPADM

## 2023-01-01 RX ORDER — TAMSULOSIN HYDROCHLORIDE 0.4 MG/1
0.4 CAPSULE ORAL 2 TIMES DAILY
COMMUNITY

## 2023-01-01 RX ORDER — HEPARIN SODIUM 5000 [USP'U]/ML
5000 INJECTION, SOLUTION INTRAVENOUS; SUBCUTANEOUS EVERY 8 HOURS
Status: DISCONTINUED | OUTPATIENT
Start: 2023-01-01 | End: 2023-01-01

## 2023-01-01 RX ORDER — HALOPERIDOL 1 MG/1
2 TABLET ORAL EVERY 8 HOURS PRN
Status: DISCONTINUED | OUTPATIENT
Start: 2023-01-01 | End: 2023-01-01

## 2023-01-01 RX ORDER — INSULIN GLARGINE 100 [IU]/ML
22 INJECTION, SOLUTION SUBCUTANEOUS NIGHTLY
COMMUNITY
Start: 2022-02-09

## 2023-01-01 RX ORDER — SULFAMETHOXAZOLE AND TRIMETHOPRIM 800; 160 MG/1; MG/1
1 TABLET ORAL 2 TIMES DAILY
COMMUNITY
Start: 2023-01-01 | End: 2023-12-08

## 2023-01-01 RX ORDER — DEXTROSE 50 % IN WATER (D50W) INTRAVENOUS SYRINGE
25
Status: DISCONTINUED | OUTPATIENT
Start: 2023-01-01 | End: 2023-01-01

## 2023-01-01 RX ORDER — OXYCODONE HYDROCHLORIDE 5 MG/1
5 TABLET ORAL EVERY 6 HOURS PRN
COMMUNITY
Start: 2023-01-01 | End: 2023-01-01

## 2023-01-01 RX ORDER — DEXTROSE MONOHYDRATE 100 MG/ML
0.3 INJECTION, SOLUTION INTRAVENOUS ONCE AS NEEDED
Status: DISCONTINUED | OUTPATIENT
Start: 2023-01-01 | End: 2023-01-01

## 2023-01-01 RX ORDER — INSULIN GLARGINE 100 [IU]/ML
INJECTION, SOLUTION SUBCUTANEOUS
COMMUNITY
Start: 2022-03-17 | End: 2023-01-01 | Stop reason: SDUPTHER

## 2023-01-01 RX ORDER — DIPHENHYDRAMINE HCL 25 MG
25 TABLET ORAL EVERY 6 HOURS PRN
COMMUNITY

## 2023-01-01 RX ORDER — CALCIUM CITRATE/VITAMIN D3 315MG-6.25
1 TABLET ORAL EVERY MORNING
COMMUNITY

## 2023-01-01 RX ORDER — DAPSONE 100 MG/1
100 TABLET ORAL EVERY MORNING
COMMUNITY

## 2023-01-01 RX ORDER — FLUTICASONE PROPIONATE 50 MCG
1 SPRAY, SUSPENSION (ML) NASAL EVERY MORNING
COMMUNITY

## 2023-01-01 RX ORDER — LANOLIN ALCOHOL/MO/W.PET/CERES
1 CREAM (GRAM) TOPICAL EVERY MORNING
COMMUNITY

## 2023-01-01 RX ORDER — LORAZEPAM 2 MG/ML
1 INJECTION INTRAMUSCULAR EVERY 4 HOURS PRN
Status: DISCONTINUED | OUTPATIENT
Start: 2023-01-01 | End: 2023-01-01

## 2023-01-01 RX ORDER — DULOXETIN HYDROCHLORIDE 30 MG/1
60 CAPSULE, DELAYED RELEASE ORAL DAILY
Status: DISCONTINUED | OUTPATIENT
Start: 2023-01-01 | End: 2023-01-01

## 2023-01-01 RX ORDER — LIDOCAINE 50 MG/G
1 PATCH TOPICAL DAILY
COMMUNITY

## 2023-01-01 RX ORDER — GABAPENTIN 100 MG/1
100 CAPSULE ORAL 3 TIMES DAILY
COMMUNITY
End: 2023-01-01 | Stop reason: ALTCHOICE

## 2023-01-01 RX ORDER — DEXAMETHASONE 4 MG/1
2 TABLET ORAL 2 TIMES DAILY
Status: DISCONTINUED | OUTPATIENT
Start: 2023-01-01 | End: 2023-01-01

## 2023-01-01 RX ORDER — IBUPROFEN 200 MG
CAPSULE ORAL
COMMUNITY
Start: 2021-07-09 | End: 2023-01-01 | Stop reason: ALTCHOICE

## 2023-01-01 RX ORDER — INSULIN LISPRO 100 [IU]/ML
INJECTION, SOLUTION INTRAVENOUS; SUBCUTANEOUS
COMMUNITY
Start: 2023-01-01 | End: 2023-01-01 | Stop reason: ALTCHOICE

## 2023-01-01 RX ORDER — OXYCODONE HYDROCHLORIDE 5 MG/1
5 TABLET ORAL EVERY 4 HOURS PRN
Status: DISCONTINUED | OUTPATIENT
Start: 2023-01-01 | End: 2023-01-01

## 2023-01-01 RX ORDER — ZINC SULFATE 50(220)MG
50 CAPSULE ORAL EVERY MORNING
COMMUNITY

## 2023-01-01 RX ADMIN — Medication 6 MG: at 02:36

## 2023-01-01 RX ADMIN — SODIUM CHLORIDE 1000 ML: 9 INJECTION, SOLUTION INTRAVENOUS at 00:18

## 2023-01-01 RX ADMIN — MEROPENEM 1 G: 1 INJECTION, POWDER, FOR SOLUTION INTRAVENOUS at 18:16

## 2023-01-01 RX ADMIN — MEROPENEM 1 G: 1 INJECTION, POWDER, FOR SOLUTION INTRAVENOUS at 21:34

## 2023-01-01 RX ADMIN — LORAZEPAM 1 MG: 2 INJECTION INTRAMUSCULAR; INTRAVENOUS at 20:49

## 2023-01-01 RX ADMIN — INSULIN LISPRO 6 UNITS: 100 INJECTION, SOLUTION INTRAVENOUS; SUBCUTANEOUS at 12:57

## 2023-01-01 RX ADMIN — MORPHINE SULFATE 4 MG: 4 INJECTION, SOLUTION INTRAMUSCULAR; INTRAVENOUS at 05:05

## 2023-01-01 RX ADMIN — HYDROMORPHONE HYDROCHLORIDE 0.4 MG: 1 INJECTION, SOLUTION INTRAMUSCULAR; INTRAVENOUS; SUBCUTANEOUS at 22:14

## 2023-01-01 RX ADMIN — HYDROMORPHONE HYDROCHLORIDE 0.4 MG: 0.5 INJECTION, SOLUTION INTRAMUSCULAR; INTRAVENOUS; SUBCUTANEOUS at 17:57

## 2023-01-01 RX ADMIN — LORAZEPAM 2 MG: 2 INJECTION INTRAMUSCULAR; INTRAVENOUS at 22:13

## 2023-01-01 RX ADMIN — INSULIN LISPRO 6 UNITS: 100 INJECTION, SOLUTION INTRAVENOUS; SUBCUTANEOUS at 13:15

## 2023-01-01 RX ADMIN — MORPHINE SULFATE 4 MG: 4 INJECTION INTRAVENOUS at 16:50

## 2023-01-01 RX ADMIN — SODIUM CHLORIDE 1000 ML: 9 INJECTION, SOLUTION INTRAVENOUS at 22:22

## 2023-01-01 RX ADMIN — INSULIN LISPRO 8 UNITS: 100 INJECTION, SOLUTION INTRAVENOUS; SUBCUTANEOUS at 08:53

## 2023-01-01 RX ADMIN — DULOXETINE HYDROCHLORIDE 60 MG: 30 CAPSULE, DELAYED RELEASE ORAL at 09:45

## 2023-01-01 RX ADMIN — HYDROMORPHONE HYDROCHLORIDE 0.4 MG: 1 INJECTION, SOLUTION INTRAMUSCULAR; INTRAVENOUS; SUBCUTANEOUS at 21:37

## 2023-01-01 RX ADMIN — LEVETIRACETAM 500 MG: 500 TABLET, FILM COATED ORAL at 20:51

## 2023-01-01 RX ADMIN — HEPARIN SODIUM 5000 UNITS: 5000 INJECTION INTRAVENOUS; SUBCUTANEOUS at 06:37

## 2023-01-01 RX ADMIN — MEROPENEM 1 G: 1 INJECTION, POWDER, FOR SOLUTION INTRAVENOUS at 10:20

## 2023-01-01 RX ADMIN — Medication 6 MG: at 20:51

## 2023-01-01 RX ADMIN — GLYCOPYRROLATE 0.2 MG: 0.2 INJECTION, SOLUTION INTRAMUSCULAR; INTRAVENOUS at 21:10

## 2023-01-01 RX ADMIN — FOLIC ACID 1 MG: 1 TABLET ORAL at 09:45

## 2023-01-01 RX ADMIN — INSULIN LISPRO 8 UNITS: 100 INJECTION, SOLUTION INTRAVENOUS; SUBCUTANEOUS at 11:47

## 2023-01-01 RX ADMIN — MEROPENEM 1 G: 1 INJECTION, POWDER, FOR SOLUTION INTRAVENOUS at 09:26

## 2023-01-01 RX ADMIN — MEROPENEM 1 G: 1 INJECTION, POWDER, FOR SOLUTION INTRAVENOUS at 08:58

## 2023-01-01 RX ADMIN — SODIUM CHLORIDE 75 ML/HR: 9 INJECTION, SOLUTION INTRAVENOUS at 09:46

## 2023-01-01 RX ADMIN — SODIUM CHLORIDE 75 ML/HR: 9 INJECTION, SOLUTION INTRAVENOUS at 23:34

## 2023-01-01 RX ADMIN — MORPHINE SULFATE 4 MG: 4 INJECTION INTRAVENOUS at 13:37

## 2023-01-01 RX ADMIN — HYDROMORPHONE HYDROCHLORIDE 0.2 MG: 0.2 INJECTION, SOLUTION INTRAMUSCULAR; INTRAVENOUS; SUBCUTANEOUS at 20:48

## 2023-01-01 RX ADMIN — OXYCODONE HYDROCHLORIDE 5 MG: 5 TABLET ORAL at 23:32

## 2023-01-01 RX ADMIN — MEROPENEM 1 G: 1 INJECTION, POWDER, FOR SOLUTION INTRAVENOUS at 14:17

## 2023-01-01 RX ADMIN — MEROPENEM 1 G: 1 INJECTION, POWDER, FOR SOLUTION INTRAVENOUS at 21:39

## 2023-01-01 RX ADMIN — DEXAMETHASONE 2 MG: 4 TABLET ORAL at 20:51

## 2023-01-01 RX ADMIN — HEPARIN SODIUM 5000 UNITS: 5000 INJECTION INTRAVENOUS; SUBCUTANEOUS at 06:06

## 2023-01-01 RX ADMIN — IOHEXOL 100 ML: 350 INJECTION, SOLUTION INTRAVENOUS at 01:02

## 2023-01-01 RX ADMIN — HALOPERIDOL 2 MG: 1 TABLET ORAL at 22:46

## 2023-01-01 RX ADMIN — MEROPENEM 1 G: 1 INJECTION, POWDER, FOR SOLUTION INTRAVENOUS at 02:26

## 2023-01-01 RX ADMIN — HEPARIN SODIUM 5000 UNITS: 5000 INJECTION INTRAVENOUS; SUBCUTANEOUS at 13:39

## 2023-01-01 RX ADMIN — HEPARIN SODIUM 5000 UNITS: 5000 INJECTION INTRAVENOUS; SUBCUTANEOUS at 14:17

## 2023-01-01 RX ADMIN — SODIUM BICARBONATE 650 MG: 325 TABLET ORAL at 20:51

## 2023-01-01 RX ADMIN — LORAZEPAM 2 MG: 2 INJECTION INTRAMUSCULAR; INTRAVENOUS at 21:36

## 2023-01-01 RX ADMIN — GLYCOPYRROLATE 0.2 MG: 0.2 INJECTION, SOLUTION INTRAMUSCULAR; INTRAVENOUS at 01:17

## 2023-01-01 RX ADMIN — INSULIN LISPRO 6 UNITS: 100 INJECTION, SOLUTION INTRAVENOUS; SUBCUTANEOUS at 17:00

## 2023-01-01 RX ADMIN — MEROPENEM 1 G: 1 INJECTION, POWDER, FOR SOLUTION INTRAVENOUS at 23:32

## 2023-01-01 RX ADMIN — HYDROMORPHONE HYDROCHLORIDE 0.4 MG: 1 INJECTION, SOLUTION INTRAMUSCULAR; INTRAVENOUS; SUBCUTANEOUS at 01:17

## 2023-01-01 RX ADMIN — CARVEDILOL 6.25 MG: 6.25 TABLET, FILM COATED ORAL at 20:51

## 2023-01-01 RX ADMIN — MEROPENEM 1 G: 1 INJECTION, POWDER, FOR SOLUTION INTRAVENOUS at 09:36

## 2023-01-01 RX ADMIN — INSULIN LISPRO 6 UNITS: 100 INJECTION, SOLUTION INTRAVENOUS; SUBCUTANEOUS at 16:40

## 2023-01-01 RX ADMIN — HEPARIN SODIUM 5000 UNITS: 5000 INJECTION INTRAVENOUS; SUBCUTANEOUS at 00:13

## 2023-01-01 RX ADMIN — VANCOMYCIN HYDROCHLORIDE 2 G: 10 INJECTION, POWDER, LYOPHILIZED, FOR SOLUTION INTRAVENOUS at 22:28

## 2023-01-01 RX ADMIN — HEPARIN SODIUM 5000 UNITS: 5000 INJECTION INTRAVENOUS; SUBCUTANEOUS at 22:46

## 2023-01-01 RX ADMIN — METOPROLOL TARTRATE 5 MG: 1 INJECTION, SOLUTION INTRAVENOUS at 18:21

## 2023-01-01 RX ADMIN — PIPERACILLIN SODIUM AND TAZOBACTAM SODIUM 4.5 G: 4; .5 INJECTION, SOLUTION INTRAVENOUS at 22:29

## 2023-01-01 RX ADMIN — MORPHINE SULFATE 4 MG: 4 INJECTION INTRAVENOUS at 01:17

## 2023-01-01 SDOH — SOCIAL STABILITY: SOCIAL INSECURITY: WERE YOU ABLE TO COMPLETE ALL THE BEHAVIORAL HEALTH SCREENINGS?: YES

## 2023-01-01 SDOH — SOCIAL STABILITY: SOCIAL INSECURITY: DO YOU FEEL ANYONE HAS EXPLOITED OR TAKEN ADVANTAGE OF YOU FINANCIALLY OR OF YOUR PERSONAL PROPERTY?: NO

## 2023-01-01 SDOH — SOCIAL STABILITY: SOCIAL INSECURITY: HAVE YOU HAD THOUGHTS OF HARMING ANYONE ELSE?: NO

## 2023-01-01 SDOH — SOCIAL STABILITY: SOCIAL INSECURITY: DO YOU FEEL UNSAFE GOING BACK TO THE PLACE WHERE YOU ARE LIVING?: NO

## 2023-01-01 SDOH — SOCIAL STABILITY: SOCIAL INSECURITY: ABUSE: ADULT

## 2023-01-01 SDOH — SOCIAL STABILITY: SOCIAL INSECURITY: ARE THERE ANY APPARENT SIGNS OF INJURIES/BEHAVIORS THAT COULD BE RELATED TO ABUSE/NEGLECT?: NO

## 2023-01-01 SDOH — SOCIAL STABILITY: SOCIAL INSECURITY: DOES ANYONE TRY TO KEEP YOU FROM HAVING/CONTACTING OTHER FRIENDS OR DOING THINGS OUTSIDE YOUR HOME?: NO

## 2023-01-01 SDOH — SOCIAL STABILITY: SOCIAL INSECURITY: HAS ANYONE EVER THREATENED TO HURT YOUR FAMILY OR YOUR PETS?: NO

## 2023-01-01 SDOH — SOCIAL STABILITY: SOCIAL INSECURITY: ARE YOU OR HAVE YOU BEEN THREATENED OR ABUSED PHYSICALLY, EMOTIONALLY, OR SEXUALLY BY ANYONE?: NO

## 2023-01-01 ASSESSMENT — COGNITIVE AND FUNCTIONAL STATUS - GENERAL
EATING MEALS: TOTAL
TURNING FROM BACK TO SIDE WHILE IN FLAT BAD: TOTAL
DAILY ACTIVITIY SCORE: 6
MOVING TO AND FROM BED TO CHAIR: TOTAL
PERSONAL GROOMING: TOTAL
DRESSING REGULAR LOWER BODY CLOTHING: TOTAL
HELP NEEDED FOR BATHING: TOTAL
CLIMB 3 TO 5 STEPS WITH RAILING: TOTAL
STANDING UP FROM CHAIR USING ARMS: TOTAL
CLIMB 3 TO 5 STEPS WITH RAILING: TOTAL
PERSONAL GROOMING: TOTAL
DRESSING REGULAR LOWER BODY CLOTHING: TOTAL
DRESSING REGULAR LOWER BODY CLOTHING: TOTAL
MOVING FROM LYING ON BACK TO SITTING ON SIDE OF FLAT BED WITH BEDRAILS: TOTAL
PATIENT BASELINE BEDBOUND: YES
WALKING IN HOSPITAL ROOM: TOTAL
STANDING UP FROM CHAIR USING ARMS: TOTAL
CLIMB 3 TO 5 STEPS WITH RAILING: TOTAL
MOVING FROM LYING ON BACK TO SITTING ON SIDE OF FLAT BED WITH BEDRAILS: TOTAL
WALKING IN HOSPITAL ROOM: TOTAL
PERSONAL GROOMING: TOTAL
PERSONAL GROOMING: TOTAL
DRESSING REGULAR UPPER BODY CLOTHING: TOTAL
MOBILITY SCORE: 6
TOILETING: TOTAL
STANDING UP FROM CHAIR USING ARMS: TOTAL
HELP NEEDED FOR BATHING: TOTAL
DAILY ACTIVITIY SCORE: 6
TOILETING: TOTAL
EATING MEALS: TOTAL
STANDING UP FROM CHAIR USING ARMS: TOTAL
PERSONAL GROOMING: TOTAL
TOILETING: TOTAL
CLIMB 3 TO 5 STEPS WITH RAILING: TOTAL
STANDING UP FROM CHAIR USING ARMS: TOTAL
HELP NEEDED FOR BATHING: TOTAL
WALKING IN HOSPITAL ROOM: TOTAL
MOVING FROM LYING ON BACK TO SITTING ON SIDE OF FLAT BED WITH BEDRAILS: TOTAL
HELP NEEDED FOR BATHING: TOTAL
STANDING UP FROM CHAIR USING ARMS: TOTAL
STANDING UP FROM CHAIR USING ARMS: TOTAL
WALKING IN HOSPITAL ROOM: TOTAL
DRESSING REGULAR UPPER BODY CLOTHING: TOTAL
MOBILITY SCORE: 6
MOVING TO AND FROM BED TO CHAIR: TOTAL
TURNING FROM BACK TO SIDE WHILE IN FLAT BAD: TOTAL
HELP NEEDED FOR BATHING: TOTAL
MOVING TO AND FROM BED TO CHAIR: TOTAL
DRESSING REGULAR UPPER BODY CLOTHING: TOTAL
PERSONAL GROOMING: TOTAL
MOVING TO AND FROM BED TO CHAIR: TOTAL
HELP NEEDED FOR BATHING: TOTAL
CLIMB 3 TO 5 STEPS WITH RAILING: TOTAL
TURNING FROM BACK TO SIDE WHILE IN FLAT BAD: TOTAL
EATING MEALS: TOTAL
EATING MEALS: TOTAL
DRESSING REGULAR UPPER BODY CLOTHING: TOTAL
MOBILITY SCORE: 6
DRESSING REGULAR LOWER BODY CLOTHING: TOTAL
DRESSING REGULAR UPPER BODY CLOTHING: TOTAL
TURNING FROM BACK TO SIDE WHILE IN FLAT BAD: TOTAL
STANDING UP FROM CHAIR USING ARMS: TOTAL
WALKING IN HOSPITAL ROOM: TOTAL
TURNING FROM BACK TO SIDE WHILE IN FLAT BAD: TOTAL
HELP NEEDED FOR BATHING: TOTAL
DRESSING REGULAR UPPER BODY CLOTHING: TOTAL
DRESSING REGULAR LOWER BODY CLOTHING: TOTAL
DAILY ACTIVITIY SCORE: 6
DAILY ACTIVITIY SCORE: 6
MOVING TO AND FROM BED TO CHAIR: TOTAL
EATING MEALS: TOTAL
CLIMB 3 TO 5 STEPS WITH RAILING: TOTAL
STANDING UP FROM CHAIR USING ARMS: TOTAL
EATING MEALS: TOTAL
TURNING FROM BACK TO SIDE WHILE IN FLAT BAD: TOTAL
DAILY ACTIVITIY SCORE: 6
WALKING IN HOSPITAL ROOM: TOTAL
MOVING FROM LYING ON BACK TO SITTING ON SIDE OF FLAT BED WITH BEDRAILS: TOTAL
MOVING TO AND FROM BED TO CHAIR: TOTAL
WALKING IN HOSPITAL ROOM: TOTAL
CLIMB 3 TO 5 STEPS WITH RAILING: TOTAL
MOVING TO AND FROM BED TO CHAIR: TOTAL
HELP NEEDED FOR BATHING: TOTAL
TOILETING: TOTAL
EATING MEALS: TOTAL
TOILETING: TOTAL
TURNING FROM BACK TO SIDE WHILE IN FLAT BAD: TOTAL
CLIMB 3 TO 5 STEPS WITH RAILING: TOTAL
MOBILITY SCORE: 6
PERSONAL GROOMING: TOTAL
MOBILITY SCORE: 6
DRESSING REGULAR LOWER BODY CLOTHING: TOTAL
MOVING TO AND FROM BED TO CHAIR: TOTAL
EATING MEALS: TOTAL
MOBILITY SCORE: 6
MOVING FROM LYING ON BACK TO SITTING ON SIDE OF FLAT BED WITH BEDRAILS: TOTAL
TURNING FROM BACK TO SIDE WHILE IN FLAT BAD: TOTAL
CLIMB 3 TO 5 STEPS WITH RAILING: TOTAL
DRESSING REGULAR LOWER BODY CLOTHING: TOTAL
MOVING FROM LYING ON BACK TO SITTING ON SIDE OF FLAT BED WITH BEDRAILS: TOTAL
MOVING TO AND FROM BED TO CHAIR: TOTAL
MOVING FROM LYING ON BACK TO SITTING ON SIDE OF FLAT BED WITH BEDRAILS: TOTAL
MOVING FROM LYING ON BACK TO SITTING ON SIDE OF FLAT BED WITH BEDRAILS: TOTAL
PERSONAL GROOMING: TOTAL
TOILETING: TOTAL
TOILETING: TOTAL
DAILY ACTIVITIY SCORE: 6
MOVING FROM LYING ON BACK TO SITTING ON SIDE OF FLAT BED WITH BEDRAILS: TOTAL
DRESSING REGULAR UPPER BODY CLOTHING: TOTAL
HELP NEEDED FOR BATHING: TOTAL
TOILETING: TOTAL
DRESSING REGULAR UPPER BODY CLOTHING: TOTAL
DAILY ACTIVITIY SCORE: 6
EATING MEALS: TOTAL
TURNING FROM BACK TO SIDE WHILE IN FLAT BAD: TOTAL
DRESSING REGULAR LOWER BODY CLOTHING: TOTAL
TOILETING: TOTAL
MOBILITY SCORE: 6
DRESSING REGULAR LOWER BODY CLOTHING: TOTAL
WALKING IN HOSPITAL ROOM: TOTAL
PERSONAL GROOMING: TOTAL

## 2023-01-01 ASSESSMENT — LIFESTYLE VARIABLES
SKIP TO QUESTIONS 9-10: 1
AUDIT-C TOTAL SCORE: 0
HOW OFTEN DO YOU HAVE A DRINK CONTAINING ALCOHOL: NEVER
AUDIT-C TOTAL SCORE: 0
HOW OFTEN DO YOU HAVE 6 OR MORE DRINKS ON ONE OCCASION: NEVER
HOW MANY STANDARD DRINKS CONTAINING ALCOHOL DO YOU HAVE ON A TYPICAL DAY: PATIENT DOES NOT DRINK

## 2023-01-01 ASSESSMENT — PAIN SCALES - PAIN ASSESSMENT IN ADVANCED DEMENTIA (PAINAD)
FACIALEXPRESSION: SMILING OR INEXPRESSIVE
FACIALEXPRESSION: SMILING OR INEXPRESSIVE
BODYLANGUAGE: RELAXED
BODYLANGUAGE: RELAXED
FACIALEXPRESSION: SMILING OR INEXPRESSIVE
CONSOLABILITY: NO NEED TO CONSOLE
TOTALSCORE: 2
BODYLANGUAGE: RELAXED
TOTALSCORE: 0
BODYLANGUAGE: RELAXED
FACIALEXPRESSION: SMILING OR INEXPRESSIVE
NEGVOCALIZATION: OCCASIONAL MOAN/GROAN, LOW SPEECH, NEGATIVE/DISAPPROVING QUALITY
TOTALSCORE: 0
BREATHING: OCCASIONAL LABORED BREATHING, SHORT PERIOD OF HYPERVENTILATION
FACIALEXPRESSION: FACIAL GRIMACING
TOTALSCORE: 1
FACIALEXPRESSION: SMILING OR INEXPRESSIVE
BREATHING: NORMAL
BODYLANGUAGE: RELAXED
CONSOLABILITY: NO NEED TO CONSOLE
TOTALSCORE: 0
BREATHING: NORMAL
BREATHING: OCCASIONAL LABORED BREATHING, SHORT PERIOD OF HYPERVENTILATION
BREATHING: OCCASIONAL LABORED BREATHING, SHORT PERIOD OF HYPERVENTILATION
CONSOLABILITY: DISTRACTED OR REASSURED BY VOICE/TOUCH
CONSOLABILITY: NO NEED TO CONSOLE
CONSOLABILITY: NO NEED TO CONSOLE
TOTALSCORE: 0
FACIALEXPRESSION: SMILING OR INEXPRESSIVE
BODYLANGUAGE: TENSE, DISTRESSED PACING, FIDGETING
BREATHING: NORMAL
CONSOLABILITY: NO NEED TO CONSOLE
CONSOLABILITY: NO NEED TO CONSOLE
BODYLANGUAGE: TENSE, DISTRESSED PACING, FIDGETING
BREATHING: NOISY LABORED BREATHING, LONG PERIODS OF HYPERVENTILATION, CHEYNE-STOKES RESPIRATIONS
TOTALSCORE: 2
TOTALSCORE: 6
BREATHING: NORMAL
FACIALEXPRESSION: SMILING OR INEXPRESSIVE
CONSOLABILITY: NO NEED TO CONSOLE
BODYLANGUAGE: RELAXED

## 2023-01-01 ASSESSMENT — ANXIETY QUESTIONNAIRES
1. FEELING NERVOUS, ANXIOUS, OR ON EDGE: NOT AT ALL
6. BECOMING EASILY ANNOYED OR IRRITABLE: NOT AT ALL
5. BEING SO RESTLESS THAT IT IS HARD TO SIT STILL: NOT AT ALL
2. NOT BEING ABLE TO STOP OR CONTROL WORRYING: NOT AT ALL
3. WORRYING TOO MUCH ABOUT DIFFERENT THINGS: NOT AT ALL
IF YOU CHECKED OFF ANY PROBLEMS ON THIS QUESTIONNAIRE, HOW DIFFICULT HAVE THESE PROBLEMS MADE IT FOR YOU TO DO YOUR WORK, TAKE CARE OF THINGS AT HOME, OR GET ALONG WITH OTHER PEOPLE: NOT DIFFICULT AT ALL
4. TROUBLE RELAXING: NOT AT ALL
GAD7 TOTAL SCORE: 0
7. FEELING AFRAID AS IF SOMETHING AWFUL MIGHT HAPPEN: NOT AT ALL

## 2023-01-01 ASSESSMENT — ENCOUNTER SYMPTOMS
SCLERAL ICTERUS: 0
LOSS OF SENSATION IN FEET: 0
FATIGUE: 1
CHEST TIGHTNESS: 0
NAUSEA: 0
MYALGIAS: 0
TROUBLE SWALLOWING: 0
VOICE CHANGE: 0
ADENOPATHY: 0
ARTHRALGIAS: 0
DEPRESSION: 0
NUMBNESS: 0
OCCASIONAL FEELINGS OF UNSTEADINESS: 0
CHILLS: 0
CONSTIPATION: 0
DIFFICULTY URINATING: 0
DEPRESSION: 0
BACK PAIN: 0
NECK PAIN: 0
HEMATURIA: 0
HEMOPTYSIS: 0
APPETITE CHANGE: 0
BLOOD IN STOOL: 0
LEG SWELLING: 0
FEVER: 0
VOMITING: 0
SEIZURES: 0
CONFUSION: 0
LIGHT-HEADEDNESS: 0
DYSURIA: 0
DIZZINESS: 0
EYE PROBLEMS: 0
BRUISES/BLEEDS EASILY: 0
SORE THROAT: 0
NERVOUS/ANXIOUS: 0
SHORTNESS OF BREATH: 0
DIARRHEA: 0

## 2023-01-01 ASSESSMENT — COLUMBIA-SUICIDE SEVERITY RATING SCALE - C-SSRS
2. HAVE YOU ACTUALLY HAD ANY THOUGHTS OF KILLING YOURSELF?: NO
1. IN THE PAST MONTH, HAVE YOU WISHED YOU WERE DEAD OR WISHED YOU COULD GO TO SLEEP AND NOT WAKE UP?: NO
6. HAVE YOU EVER DONE ANYTHING, STARTED TO DO ANYTHING, OR PREPARED TO DO ANYTHING TO END YOUR LIFE?: NO

## 2023-01-01 ASSESSMENT — PATIENT HEALTH QUESTIONNAIRE - PHQ9
2. FEELING DOWN, DEPRESSED OR HOPELESS: NOT AT ALL
1. LITTLE INTEREST OR PLEASURE IN DOING THINGS: NOT AT ALL
SUM OF ALL RESPONSES TO PHQ9 QUESTIONS 1 AND 2: 0
2. FEELING DOWN, DEPRESSED OR HOPELESS: NOT AT ALL
1. LITTLE INTEREST OR PLEASURE IN DOING THINGS: NOT AT ALL
SUM OF ALL RESPONSES TO PHQ9 QUESTIONS 1 & 2: 0

## 2023-01-01 ASSESSMENT — PAIN SCALES - WONG BAKER
WONGBAKER_NUMERICALRESPONSE: NO HURT
WONGBAKER_NUMERICALRESPONSE: HURTS WHOLE LOT
WONGBAKER_NUMERICALRESPONSE: NO HURT
WONGBAKER_NUMERICALRESPONSE: HURTS LITTLE MORE
WONGBAKER_NUMERICALRESPONSE: HURTS LITTLE BIT
WONGBAKER_NUMERICALRESPONSE: NO HURT

## 2023-01-01 ASSESSMENT — ACTIVITIES OF DAILY LIVING (ADL)
TOILETING: DEPENDENT
LACK_OF_TRANSPORTATION: NO
HEARING - LEFT EAR: FUNCTIONAL
ADEQUATE_TO_COMPLETE_ADL: NO
LACK_OF_TRANSPORTATION: PATIENT DECLINED
FEEDING YOURSELF: DEPENDENT
HEARING - RIGHT EAR: FUNCTIONAL
WALKS IN HOME: DEPENDENT
DRESSING YOURSELF: DEPENDENT
PATIENT'S MEMORY ADEQUATE TO SAFELY COMPLETE DAILY ACTIVITIES?: NO
JUDGMENT_ADEQUATE_SAFELY_COMPLETE_DAILY_ACTIVITIES: NO
GROOMING: DEPENDENT
BATHING: DEPENDENT

## 2023-01-01 ASSESSMENT — PAIN - FUNCTIONAL ASSESSMENT
PAIN_FUNCTIONAL_ASSESSMENT: PAINAD (PAIN ASSESSMENT IN ADVANCED DEMENTIA SCALE)
PAIN_FUNCTIONAL_ASSESSMENT: PAINAD (PAIN ASSESSMENT IN ADVANCED DEMENTIA SCALE)
PAIN_FUNCTIONAL_ASSESSMENT: 0-10
PAIN_FUNCTIONAL_ASSESSMENT: 0-10
PAIN_FUNCTIONAL_ASSESSMENT: WONG-BAKER FACES
PAIN_FUNCTIONAL_ASSESSMENT: PAINAD (PAIN ASSESSMENT IN ADVANCED DEMENTIA SCALE)
PAIN_FUNCTIONAL_ASSESSMENT: WONG-BAKER FACES
PAIN_FUNCTIONAL_ASSESSMENT: FLACC (FACE, LEGS, ACTIVITY, CRY, CONSOLABILITY)
PAIN_FUNCTIONAL_ASSESSMENT: WONG-BAKER FACES
PAIN_FUNCTIONAL_ASSESSMENT: PAINAD (PAIN ASSESSMENT IN ADVANCED DEMENTIA SCALE)
PAIN_FUNCTIONAL_ASSESSMENT: WONG-BAKER FACES

## 2023-01-01 ASSESSMENT — PAIN SCALES - GENERAL
PAINLEVEL_OUTOF10: 7
PAINLEVEL_OUTOF10: 0 - NO PAIN

## 2023-03-27 NOTE — TELEPHONE ENCOUNTER
Patient went to pharmacy  prescription for Lantus and 42 day script was called in and it's usually 30 day. Patient had to pay an additional $35 for 12 days and isn't happy and wants to know why 42 day was called in.

## 2023-03-29 NOTE — TELEPHONE ENCOUNTER
Patients wife called back concerning her hubands insulin and I read notes that not sure why it was called in this way. It should have been a 30 day prescription but he received a 42 day prescription and paid $70 and didn't even get a two months supply. She was persistent that provider or his MA call her back to explain why. [phone]

## 2023-04-03 NOTE — TELEPHONE ENCOUNTER
Needs a refill on his Isosorbide 30 mg takes it 1 time a day please send it to [facility] in [place]

## 2023-05-15 NOTE — TELEPHONE ENCOUNTER
Refill on Magnesium oxide 400 mg    To [facility]   PAST MEDICAL HISTORY:  Hypercholesterolemia     Hypertension     Lumbar radiculopathy     Lumbar spinal stenosis     Spondylolisthesis at L3-L4 level     Type 2 diabetes mellitus

## 2023-06-12 PROBLEM — M81.0 OSTEOPOROSIS: Status: ACTIVE | Noted: 2023-01-01

## 2023-06-12 PROBLEM — M54.14 THORACIC RADICULITIS: Status: ACTIVE | Noted: 2023-01-01

## 2023-06-12 PROBLEM — C77.9 MALIGNANT MELANOMA METASTATIC TO LYMPH NODE (MULTI): Status: ACTIVE | Noted: 2023-01-01

## 2023-06-12 PROBLEM — I82.509 CHRONIC DEEP VEIN THROMBOSIS (DVT) (MULTI): Status: ACTIVE | Noted: 2023-01-01

## 2023-06-12 PROBLEM — M06.09 SERONEGATIVE ARTHROPATHY OF MULTIPLE SITES (MULTI): Status: ACTIVE | Noted: 2023-01-01

## 2023-06-12 PROBLEM — M79.89 LEFT LEG SWELLING: Status: ACTIVE | Noted: 2023-01-01

## 2023-06-12 PROBLEM — Z79.631 METHOTREXATE, LONG TERM, CURRENT USE: Status: ACTIVE | Noted: 2023-01-01

## 2023-06-12 PROBLEM — M25.512 CHRONIC LEFT SHOULDER PAIN: Status: ACTIVE | Noted: 2023-01-01

## 2023-06-12 PROBLEM — C43.72: Status: ACTIVE | Noted: 2023-01-01

## 2023-06-12 PROBLEM — R33.9 INCOMPLETE EMPTYING OF BLADDER: Status: ACTIVE | Noted: 2023-01-01

## 2023-06-12 PROBLEM — G89.29 CHRONIC LEFT SHOULDER PAIN: Status: ACTIVE | Noted: 2023-01-01

## 2023-06-12 PROBLEM — N40.0 BPH (BENIGN PROSTATIC HYPERPLASIA): Status: ACTIVE | Noted: 2023-01-01

## 2023-06-12 PROBLEM — I10 ESSENTIAL HYPERTENSION: Status: ACTIVE | Noted: 2023-01-01

## 2023-06-12 PROBLEM — M54.16 LUMBAR RADICULITIS: Status: ACTIVE | Noted: 2023-01-01

## 2023-06-12 PROBLEM — S32.001A LUMBAR BURST FRACTURE (MULTI): Status: ACTIVE | Noted: 2023-01-01

## 2023-06-12 PROBLEM — T45.1X5S ADVERSE EFFECT OF ANTINEOPLASTIC AND IMMUNOSUPPRESSIVE DRUGS, SEQUELA: Status: ACTIVE | Noted: 2023-01-01

## 2023-06-12 PROBLEM — I50.22 CHRONIC SYSTOLIC HEART FAILURE (MULTI): Status: ACTIVE | Noted: 2023-01-01

## 2023-06-12 PROBLEM — E78.5 HYPERLIPIDEMIA: Status: ACTIVE | Noted: 2023-01-01

## 2023-06-12 PROBLEM — E27.8: Status: ACTIVE | Noted: 2023-01-01

## 2023-06-12 PROBLEM — E55.9 VITAMIN D DEFICIENCY: Status: ACTIVE | Noted: 2023-01-01

## 2023-06-12 PROBLEM — D49.0 IPMN (INTRADUCTAL PAPILLARY MUCINOUS NEOPLASM): Status: ACTIVE | Noted: 2023-01-01

## 2023-06-12 PROBLEM — C43.9 IN-TRANSIT METASTASIS FROM MALIGNANT MELANOMA OF SKIN (MULTI): Status: ACTIVE | Noted: 2023-01-01

## 2023-06-12 PROBLEM — S32.000A COMPRESSION FRACTURE OF LUMBAR VERTEBRA (MULTI): Status: ACTIVE | Noted: 2023-01-01

## 2023-06-12 PROBLEM — C77.9 IN-TRANSIT METASTASIS FROM MALIGNANT MELANOMA OF SKIN (MULTI): Status: ACTIVE | Noted: 2023-01-01

## 2023-06-12 PROBLEM — N18.32: Status: ACTIVE | Noted: 2023-01-01

## 2023-06-12 PROBLEM — M48.062 SPINAL STENOSIS OF LUMBAR REGION WITH NEUROGENIC CLAUDICATION: Status: ACTIVE | Noted: 2023-01-01

## 2023-06-12 PROBLEM — M75.82 TENDINITIS OF LEFT ROTATOR CUFF: Status: ACTIVE | Noted: 2023-01-01

## 2023-06-12 PROBLEM — I13.0: Status: ACTIVE | Noted: 2023-01-01

## 2023-06-12 PROBLEM — M53.86 DECREASED RANGE OF MOTION OF LUMBAR SPINE: Status: ACTIVE | Noted: 2023-01-01

## 2023-06-12 PROBLEM — D51.9 VITAMIN B12 DEFICIENCY ANEMIA: Status: ACTIVE | Noted: 2023-01-01

## 2023-06-12 PROBLEM — M15.9 DJD (DEGENERATIVE JOINT DISEASE), MULTIPLE SITES: Status: ACTIVE | Noted: 2023-01-01

## 2023-06-12 PROBLEM — I89.0 LYMPHEDEMA: Status: ACTIVE | Noted: 2023-01-01

## 2023-06-12 PROBLEM — M54.50 LOW BACK PAIN: Status: ACTIVE | Noted: 2023-01-01

## 2023-06-12 PROBLEM — I50.42: Status: ACTIVE | Noted: 2023-01-01

## 2023-06-12 PROBLEM — C79.31 MELANOMA METASTATIC TO BRAIN (MULTI): Status: ACTIVE | Noted: 2023-01-01

## 2023-06-12 PROBLEM — L03.90 CELLULITIS: Status: ACTIVE | Noted: 2023-01-01

## 2023-06-12 PROBLEM — G47.33 OSA ON CPAP: Status: ACTIVE | Noted: 2023-01-01

## 2023-06-12 PROBLEM — M75.02 ADHESIVE CAPSULITIS OF LEFT SHOULDER: Status: ACTIVE | Noted: 2023-01-01

## 2023-06-12 PROBLEM — D37.8 NEOPLASM OF UNCERTAIN BEHAVIOR OF BODY OF PANCREAS: Status: ACTIVE | Noted: 2023-01-01

## 2023-06-14 PROBLEM — I10 ESSENTIAL HYPERTENSION: Status: RESOLVED | Noted: 2023-01-01 | Resolved: 2023-01-01

## 2023-06-14 PROBLEM — D37.8 NEOPLASM OF UNCERTAIN BEHAVIOR OF BODY OF PANCREAS: Status: RESOLVED | Noted: 2023-01-01 | Resolved: 2023-01-01

## 2023-06-14 PROBLEM — I50.22 CHRONIC SYSTOLIC HEART FAILURE (MULTI): Status: RESOLVED | Noted: 2023-01-01 | Resolved: 2023-01-01

## 2023-06-14 PROBLEM — G89.29 CHRONIC LEFT SHOULDER PAIN: Status: RESOLVED | Noted: 2023-01-01 | Resolved: 2023-01-01

## 2023-06-14 PROBLEM — L03.90 CELLULITIS: Status: RESOLVED | Noted: 2023-01-01 | Resolved: 2023-01-01

## 2023-06-14 PROBLEM — M25.512 CHRONIC LEFT SHOULDER PAIN: Status: RESOLVED | Noted: 2023-01-01 | Resolved: 2023-01-01

## 2023-06-14 NOTE — PROGRESS NOTES
"Subjective   Patient ID: Reilly Altamirano is a 67 y.o. male who presents for Follow-up.    HPI     Review of Systems    Objective   /68   Pulse 68   Ht 1.93 m (6' 4\")   Wt 138 kg (304 lb)   BMI 37.00 kg/m²     Physical Exam    Assessment/Plan          "

## 2023-06-14 NOTE — PROGRESS NOTES
"Subjective   Reason for Visit: Reilly Altamirano is an 67 y.o. male here for a fu  visit.     Past Medical, Surgical, and Family History reviewed and updated in chart.         HPI    Patient Care Team:  Cristian Calix DO as PCP - General  Cristian Calix DO as PCP - [facility] Attributed Provider     Review of Systems   All other systems reviewed and are negative.      Objective   Vitals:  /68   Pulse 68   Ht 1.93 m (6' 4\")   Wt 138 kg (304 lb)   BMI 37.00 kg/m²       Physical Exam  Vitals and nursing note reviewed.   Constitutional:       General: He is not in acute distress.     Appearance: Normal appearance. He is well-developed. He is not toxic-appearing.   HENT:      Head: Normocephalic and atraumatic.      Right Ear: Tympanic membrane and external ear normal.      Left Ear: Tympanic membrane and external ear normal.      Nose: Nose normal.      Mouth/Throat:      Mouth: Mucous membranes are moist.      Pharynx: Oropharynx is clear. No oropharyngeal exudate or posterior oropharyngeal erythema.      Tonsils: No tonsillar exudate. 2+ on the right. 2+ on the left.   Eyes:      Extraocular Movements: Extraocular movements intact.      Conjunctiva/sclera: Conjunctivae normal.   Cardiovascular:      Rate and Rhythm: Normal rate and regular rhythm.      Pulses: Normal pulses.      Heart sounds: Normal heart sounds. No murmur heard.  Pulmonary:      Effort: Pulmonary effort is normal.      Breath sounds: Normal breath sounds.   Abdominal:      General: Abdomen is flat. Bowel sounds are normal.      Palpations: Abdomen is soft.   Musculoskeletal:      Cervical back: Neck supple.   Feet:      Right foot:      Skin integrity: Skin integrity normal. No ulcer, blister, skin breakdown, erythema, warmth or callus.      Toenail Condition: Right toenails are normal.      Left foot:      Skin integrity: Skin integrity normal. No ulcer, blister, skin breakdown, erythema, warmth or callus.      Toenail Condition: " Left toenails are normal.   Lymphadenopathy:      Cervical: No cervical adenopathy.   Skin:     General: Skin is warm and dry.      Capillary Refill: Capillary refill takes more than 3 seconds.      Findings: No rash.   Neurological:      Mental Status: He is alert. Mental status is at baseline.      Sensory: Sensation is intact.   Psychiatric:         Mood and Affect: Mood normal.         Behavior: Behavior normal.         Thought Content: Thought content normal.         Judgment: Judgment normal.         Assessment/Plan   Problem List Items Addressed This Visit    None

## 2023-06-21 NOTE — TELEPHONE ENCOUNTER
Cathie, patient's wife called and would like for you to call her to go over the after care summary that was given to the patient. She wouldn't address anything to me.

## 2023-06-21 NOTE — TELEPHONE ENCOUNTER
Patient wife is asking about labs results he just had done. Also asking states that he has another order to do blood work. When would you like him to have these done states it says September but his appointment is in December

## 2023-11-09 NOTE — TELEPHONE ENCOUNTER
Please call back Bessy to discuss treatment plan for patient. Moving to skilled nursing facility and may need weekly transfusions at [facility]. Bessy is faxing over orders. She wanted to make sure Dr. Meier would be able to order transfuions. Thank you!

## 2023-11-17 NOTE — TELEPHONE ENCOUNTER
Aundrea from [facility] needs to speak with a nurse in Dr. Meier's practice to ensure they have received faxed orders for transfusions. This is their 5th time calling to get in touch with someone. Please call them ([phone]). Thank you!

## 2023-11-22 NOTE — LETTER
November 23, 2023     Osvaldo Wooten MD MPH  [id number]  Department Of Surgery-Surgical Oncology  [facility] [address]    Patient: Reilly Altamirano   YOB: 1955   Date of Visit: 11/22/2023       Dear Dr. Osvaldo Wooten MD MPH:    Thank you for referring Reilly Altamirnao to me for evaluation. Below are my notes for this consultation.  If you have questions, please do not hesitate to call me. I look forward to following your patient along with you.       Sincerely,     Rick Meier MD      CC: No Recipients  ______________________________________________________________________________________    .Patient ID: Reilly Altamirano is a 68 y.o. male.  Referring Physician: No referring provider defined for this encounter.  Primary Care Provider: Cristian Calix DO     Chief Complaint   Patient presents with   • Follow-up     Re-establishing care   • Melanoma          Cancer History:   Treatment Synopsis:   Referred by Dr. Wooten for adjuvant treatment of advanced stage III melanoma.     Mr. 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 He had originally presented in August of 2019 with an ulcerated wound which was managed as diabetic ulcer for almost 4-5  months. This left heel melanoma was 2.9mm Breslow depth. He has a history of right forearm lesion excised which was SCC and left cheek nevus- mild atypia. He underwent WLE on 01.31.2020 and then skin graft in early March 2020. He is staged at wM3bG3c  (IIID). The patient started adjuvant nivolumab in March 2020. In July 2020, he was admitted with diarrhea, nausea and vomiting. This was found to be secondary to C.Diff for which he received antibiotics. However, 2 weeks after since his diarrhea was not  resolving & C.diff was negative by PCR, he was started on high dose steroids which led to improvement in diarrhea. His wound in the heel was biopsied again on July 27, 2020. 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 These are being  managed with localized  TVEC injections since Sept 2020 by Dr. Wooten. We restarted Nivolumab on Nov 19, 2020. He stopped this after Dec 3, 2020 for his surgery.     He came back to restart therapy on Feb 4, 2021 and got one dose. Following this was admitted in the hospital for almost a month. Diagnosed with Tenosynovitis- started on high dose steroids and did well on them. I met him in March 2021 and we came up with  a plan to taper off his steroids. However, soon after the taper he was again admitted to the hospital with pericarditis. This time rheumatology saw him and put him on low dose MTX. he also has active intransit mets, but a PET scan on May 6, 2021 did not  identify any distant metastatic disease. He was dced home on high dose steroids and low dose MTX. I tapered the steroids slowly. He has already seen endocrinology, rheumatology and cardiology by now. I met him on 06/30/2021 and he informed me that Rheumatology  wants him to be prednisone 20mg by mouth daily. I deferred the steroid dosing to rheumatology. Endocrinology is already following him for blood sugar management. From Medical Oncology perspective, we decided that he will be on surveillance.      He met with Dr. Wooten on 10/26/2021 who saw some new in-transit mets. He biopsied those and they are positive for melanoma. NRAS positive. PET scan lit up in the left groin inguinal nodes. They have not been biopsied, but most likely are positive  for melanoma. I started MEKTOVI 45mg by mouth BID (after lot of deliberation for final assistance) in January end 2022. It was stopped in Feb 2022. He was admitted to  [facility] on Jan 25, 2022 and dced to SNF on Feb 9, 2022 (diagnosed with cellulitis  and confusion). He was dced to a SNF and came out of SNF after several weeks.      He visited me again on 4/6/22 and we ordered a PET scan and MRI brain to restage the disease. These were completed on 04/29/2022. MRI brain was unremarkable for any  metastasis. PET scan showed hypermetabolic inguinal lymph node with mixed pattern. One  more subcutaneous nodule was also noted on the leg.  His case was discussed in the [facility] on 5/9/22 and a recommendation was made for TVEC. He was receiving TVEC with Dr. Wooten since then. However, a PET scan on 09/09/2022 showed enlarging lymph node  in the left groin. A tumor board discussion ensued on 09/12/22, and surgical options are still on the table. I discussed his case with Dr. Wooten and he will be going for surgical resection soon. I conveyed these reccs to him on 09/27/22.     He was taken to OR on 10/12/2022 and extensive resection was pursued. PET scan on 01/27/23 showed recurrent disease with potential mets to brain. I spoke to him on 02/01/2023 and ordered BRAIN MRI which was completed on 02/07/2023 which showed a new brain  metastasis in the frontal lobe. Mr. Altamirano met me in person on 02/15/2023- we referred him for GKRS to frontal lobe nodule. We reviewed the imaging together, and discussed potential treatments.     He met with the GKRS team on 02/21/2023. He completed GKRS on 03/02/2023. He started chemotherapy on 03/08/2023. After 3 rounds of chemotherapy, PET scan showed progressive disease and new in-transit mets appeared on the skin. He is now going for a Phase  1 clinical trial at [facility].     Mr. Altamirano went on to a TIL trial and had a prolonged course of admission at [facility]. Unfortunately, his disease kept progressing after the procedure. He also had several complications (details noted in discharge summary from [facility] (11/13/2023). He was discharged to a Rehab facility (The facility refused to transport the patient for clinic appointment on 11/22/23). We spoke on phone on 11/22/23.     Treatment History:   1. Nivolumab 240mg IVPB q2w- Start date- 03.19.2020 s/p 6 cycles switched to 480mg.  2. Nivolumab 480mg IVPB q4w- Switched from 240mg. Start date 06.11.2020. Held since July 9, 2020.  3. Nivolumab 240mg IVPB  "Q2w- restarted on 11/19/2020- today is C2 (12/3/2020)- patient stopped on his own accord after this.   4. MEKTOVI- Started in end of January 2022 and stopped after 4 weeks in Feb 2022- he was admitted to [facility] with sepsis and fatigue, but MEKTOVI was thought to the be the cause of this.      5. Chemotherapy- CVT regimen  C1- 03/08/2023  C2- 04/03/2023  C3- 04/19/2023- Dced after progressive disease is noted.    6. TIL therapy pursued at [facility] between July 2023 till Nov 2023. Not successful in controlling the disease.       Subjective  Please refer to \"Notes/Cancer History\" above for complete History of present illness.     Mr Reilly Altamirano is recovering in the rehab center. He is able to sit with support, but still not able to stand. He wants to pursue treatment.      Review of Systems:   Review of Systems   Constitutional:  Positive for fatigue. Negative for appetite change, chills and fever.        Severe weakness and progressive disease.    HENT:   Negative for hearing loss, mouth sores, sore throat, trouble swallowing and voice change.    Eyes:  Negative for eye problems and icterus.   Respiratory:  Negative for chest tightness, hemoptysis and shortness of breath.    Cardiovascular:  Negative for chest pain and leg swelling.   Gastrointestinal:  Negative for blood in stool, constipation, diarrhea, nausea and vomiting.   Genitourinary:  Negative for difficulty urinating, dysuria, hematuria, nocturia and pelvic pain.    Musculoskeletal:  Negative for arthralgias, back pain, gait problem, myalgias and neck pain.   Skin:  Negative for itching and rash.   Neurological:  Negative for dizziness, gait problem, light-headedness, numbness and seizures.   Hematological:  Negative for adenopathy. Does not bruise/bleed easily.   Psychiatric/Behavioral:  Negative for confusion and depression. The patient is not nervous/anxious.          MEDICAL HISTORY  Past Medical History:   Diagnosis Date   • Abnormal " electromyogram (EMG)     Abnormal electromyogram (EMG)   • Abnormal findings on diagnostic imaging of other abdominal regions, including retroperitoneum     Abnormal CT of the abdomen   • Abnormal findings on diagnostic imaging of other specified body structures     Abnormal CT of the chest   • Cardiomegaly 12/15/2020    Left ventricular hypertrophy   • Cellulitis of left lower limb 12/08/2021    Cellulitis of left lower extremity   • Cellulitis of left lower limb 05/23/2022    Cellulitis of left lower extremity   • Chronic pain syndrome 06/24/2021    Chronic pain syndrome   • Depression, unspecified 07/07/2021    Depressive disorder   • Encounter for other preprocedural examination 07/07/2021    Preoperative clearance   • Encounter for preprocedural cardiovascular examination 07/07/2021    Preoperative cardiovascular examination   • Generalized anxiety disorder 09/01/2020    Generalized anxiety disorder   • Long term (current) use of opiate analgesic 07/07/2021    Long term prescription opiate use   • Neoplasm of unspecified behavior of digestive system 09/07/2021    Pancreatic neoplasm   • Other conditions influencing health status 04/14/2021    Uncontrolled diabetes mellitus with diabetic neuropathy   • Other mucopurulent conjunctivitis, unspecified eye 08/25/2020    Pink eye   • Other neuromuscular dysfunction of bladder 12/30/2019    Frequency-urgency syndrome   • Other specified disorders of adrenal gland (CMS/HCC) 07/07/2021    Adrenal mass   • Other specified soft tissue disorders 11/16/2020    Redness and swelling of lower leg   • Other specified symptoms and signs involving the digestive system and abdomen 07/16/2020    Abdominal weakness   • Personal history of (healed) traumatic fracture 07/10/2020    History of fracture of rib   • Personal history of diabetic foot ulcer 04/14/2021    History of diabetic ulcer of foot   • Personal history of malignant melanoma of skin 05/15/2021    History of malignant  "melanoma of skin   • Personal history of other diseases of the circulatory system     History of abnormal electrocardiography   • Personal history of other diseases of the circulatory system 07/20/2021    History of congestive heart failure   • Personal history of other endocrine, nutritional and metabolic disease 06/08/2021    History of type 2 diabetes mellitus   • Personal history of other medical treatment     H/O Doppler ultrasound   • Personal history of other medical treatment     History of echocardiogram   • Personal history of other specified conditions 08/06/2021    History of urinary retention   • Personal history of other specified conditions 12/30/2019    History of nocturia   • Postprocedural seroma of a musculoskeletal structure following other procedure 02/13/2020    Postoperative seroma of musculoskeletal structure after non-musculoskeletal procedure   • Type 2 diabetes mellitus with foot ulcer (CODE) (CMS/[facility]) 07/07/2021    Type 2 diabetes mellitus with foot ulcer, with long-term current use of insulin       FAMILY HISTORY  Family History   Problem Relation Name Age of Onset   • Diabetes Other     • Cancer Other         TOBACCO HISTORY  Tobacco Use: Low Risk  (6/14/2023)    Patient History    • Smoking Tobacco Use: Never    • Smokeless Tobacco Use: Never    • Passive Exposure: Not on file       SOCIAL HISTORY  Social Connections: Not on file        Outpatient Medication Profile:  Current Outpatient Medications on File Prior to Visit   Medication Sig Dispense Refill   • amLODIPine (Norvasc) 10 mg tablet TAKE ONE TABLET BY MOUTH DAILY 90 tablet 3   • aspirin 81 mg EC tablet Take 1 tablet (81 mg) by mouth once daily.     • atorvastatin (Lipitor) 20 mg tablet Take 1 tablet (20 mg) by mouth once daily at bedtime.     • BD Jackie 2nd Gen Pen Needle 32 gauge x 5/32\" needle to be used with insulin 4 times per day.     • Blood Glucose Test strip DISPENSE BRAND PREFERRED BY INSURANCE USE TO TEST BLOOD SUGAR " 3-4 TIMES DAILY FOR INSULIN DEPEND DIABETES     • carvedilol (Coreg) 6.25 mg tablet Take 1 tablet (6.25 mg) by mouth 2 times a day with meals.     • cyanocobalamin (Vitamin B-12) 1,000 mcg tablet Take 1 tablet (1,000 mcg) by mouth once daily.     • DULoxetine (Cymbalta) 30 mg DR capsule TAKE ONE CAPSULE BY MOUTH DAILY. TAKE WITH 60MG DOSE FOR TOTAL OF 90MG DAILY 90 capsule 0   • DULoxetine (Cymbalta) 60 mg DR capsule Take 1 capsule (60 mg) by mouth once daily. 90 capsule 3   • folic acid (Folvite) 1 mg tablet Take 1 tablet (1 mg) by mouth once daily.     • FreeStyle Lancets 28 gauge USE TO CHECK BLOOD SUGAR THREE TO FOUR TIMES DAILY     • furosemide (Lasix) 20 mg tablet Take 1 tablet (20 mg) by mouth once daily.     • HumaLOG KwikPen Insulin 100 unit/mL injection INJECT 8 UNITS SUBCUTANEOUSLY 3 TIMES DAILY PLUS SLIDING SCALE AS DIRECTED.  MAX OF 66 UNITS PER DAY.     • ibuprofen 600 mg tablet Take 1 tablet (600 mg) by mouth 3 times a day as needed.     • insulin glargine (Lantus) 100 unit/mL injection Inject 35 Units under the skin once daily at bedtime.     • insulin lispro (HumaLOG) 100 unit/mL injection Inject SSI TID and at -200=4u, 201-250=6units, 251-300=8u, 301-350=10u, 351-400=12 units, if>400 call MD     • isosorbide mononitrate ER (Imdur) 30 mg 24 hr tablet Take 1 tablet (30 mg) by mouth once daily in the morning. Take before meals. 90 tablet 3   • LORazepam (Ativan) 1 mg tablet take 1 tablet by mouth once a day as needed for anxiety     • magnesium oxide (Mag-Ox) 400 mg (241.3 mg magnesium) tablet TAKE ONE TABLET BY MOUTH DAILY 90 tablet 0   • ondansetron (Zofran) 8 mg tablet Take 1 tablet (8 mg) by mouth every 8 hours if needed for nausea or vomiting.     • prochlorperazine (Compazine) 10 mg tablet Take 1 tablet (10 mg) by mouth every 6 hours.     • tamsulosin (Flomax) 0.4 mg 24 hr capsule Take 1 capsule (0.4 mg) by mouth once daily.     • zinc gluconate 50 mg tablet Take 1 tablet (50 mg) by mouth  once daily.       No current facility-administered medications on file prior to visit.         Performance Status:  Symptomatic; in bed >50% of the day     Vitals and Measurements:   There were no vitals taken for this visit.      Physical Exam:   Physical Exam         Lab Results:  I have reviewed these laboratory results:     Lab Results   Component Value Date    WBC 8.6 04/24/2023    HGB 11.1 (L) 04/24/2023    HCT 32.8 (L) 04/24/2023    MCV 91 04/24/2023     04/24/2023         Chemistry    Lab Results   Component Value Date/Time     04/24/2023 0915    K 4.7 04/24/2023 0915     04/24/2023 0915    CO2 23 04/24/2023 0915    BUN 26 (H) 04/24/2023 0915    CREATININE 1.17 04/24/2023 0915    Lab Results   Component Value Date/Time    CALCIUM 8.8 04/24/2023 0915    ALKPHOS 113 04/24/2023 0915    AST 12 04/24/2023 0915    ALT 15 04/24/2023 0915    BILITOT 0.5 04/24/2023 0915            Lab Results   Component Value Date    TSH 3.24 04/03/2023    THYROIDPAB <28 02/26/2021        Radiology Result:  I have reviewed the latest Imaging in PACS and the findings are noted in this note. I discussed the results of the latest imaging with the patient. All previous imaging were reviewed at the time it was completed. Full records are available in the EMR for review as well.     === 05/22/23 ===    CT CHEST ABDOMEN PELVIS W IV CONTRAST    - Impression -  CHEST:    Multiple bilateral pulmonary nodules stable to slightly increased in  size since PET-CT 05/05/2023. No obvious interval change in  heterogenous likely hollis masses in the inferior right  hilum/mediastinum.    ABDOMEN AND PELVIS:    Prominent soft tissue thickening in the left inguinal region,  possible local disease recurrence, with adenopathy in the left  pelvis, multiple presumed metastatic lesions involving the liver and  right adrenal gland, multiple peritoneal nodules and nodular presumed  metastasis near the umbilicus.    Hypodense lesion in the body  of the pancreas. This could be  metastatic or unrelated cystic neoplasm or pseudocyst.    Nonobstructive left kidney stone. Higher than simple fluid density  left renal lesion. Differential includes cystic metastasis and  proteinaceous/hemorrhagic cyst.    Contracted thick-walled gallbladder.    Postop and degenerative changes of the spine.    Other findings as described above.    === 03/02/23 ===    MR GAMMA KNIFE TREATMENT PLANNING BRAIN MRI W OR WO CONTRAST    - Impression -  Volumetric MRI performed for therapy planning purposes with findings  as described above.           Pathology Results:  I have reviewed the full pathology report recorded in the EMR. The pertinent portions indicating diagnosis are listed here in the note. for details please refer to the full report recorded in the EMR.    Surgical Pathology [Oct 28 2022 3:04PM] ([id number])     Specimens: LEFT PELVIC LYMPH NODE CONTENTS /LEFT INGUINAL LYMPH NODE CONTENTS, STITCH MARKED SAPHENOUS VEIN, SINGLE/LEFT LATERAL THIGH IN-TRANSIT LESION /LEFT MIDDLE THIGH IN-TRANSIT LESION /LEFT MEDIAL THIGH IN-TRANSIT LESION /LEFT LOWER LEG ANTERIOR  IN-TRANSIT LESION /LEFT LOWER LEG POSTERIOR IN-TRANSIT LESION /LEFT LOWER LEG INFERIOR IN-TRANSIT LESION     Name RUMA ALVAREZ     Accession #: [id number]   Pathologist: DIAMOND TOVAR MD, Board Certified   Dermatopathologist    Date of Procedure: 10/12/2022   Date Received: 10/12/2022   Date Reported 10/28/2022   Submitting Physician: MIGDALIA CERDA MD   Location: [facility]   Copy To/Referring/Attending:   FELIZ NICHOLSON MD Other External  #     FINAL DIAGNOSIS   A. SPECIMEN LABELED LEFT PELVIC LYMPH NODE CONTENTS, EXCISION:   --METASTATIC MELANOMA INVOLVING 3 OF 14 LYMPHS (3/14)   --SUBCAPSULAR AND PARENCHYMAL TUMOR DEPOSITS   --LARGEST DEPOSIT APPROXIMATELY 15 MM  IN GREATEST DIMENSION   --EXTRACAPSULAR EXTENSION PRESENT   --PERINODAL LYMPHOVASCULAR INVASION PRESENT     COMMENT: Routine and  immunostained sections including Melan-A and SOX-10 with adequate controls were reviewed.     B. SPECIMEN  LABELED LEFT INGUINAL LYMPH NODE CONTENTS, EXCISION:   --METASTATIC MELANOMA, 8.5 CM, PRESENT AT SURGICAL MARGINS, SEE COMMENT   --METASTATIC MELANOMA PRESENT IN 3 OF 4 LYMPH NODES (3/4), SEE COMMENT   --SUBCAPSULAR AND PARENCHYMAL TUMOR DEPOSITS    --LARGEST DEPOSIT AT LEAST 10 MM IN GREATEST DIMENSION   --NO EXTRACAPSULAR EXTENSION SEEN   --SAPHENOUS VEIN MARGIN NEGATIVE FOR MELANOMA   --PRIOR SURGICAL SITE CHANGES     COMMENT: Gross examination identified a mass measuring 8.5  cm in greatest dimension and four definitive lymph nodes. Microscopically, the mass consists of multinodular, partially necrotic metastatic melanoma involving the dermis and subcutaneous adipose tissue without associated lymph node tissue, focally present  at surgical margins. Differential considerations for this finding could include completely replaced matted lymphnodes (the exact number of which cannot be determined) and/or soft tissue metastatic deposits. Metastatic melanoma is also present in three  of the four separately identified lymph nodes.     Routine and immunostained sections including Melan-A and SOX-10 with adequate   controls were reviewed.     C. SKIN, LEFT LATERAL THIGH IN-TRANSIT LESION, EXCISION:   --METASTATIC  MELANOMA, NOT PRESENT AT INKED SURGICAL MARGINS     COMMENT: Routine and immunostained sections including Melan-A and SOX-10 with adequate controls were reviewed. There are focal single and nested atypical dermal melanocytes identified by immunohistochemistry  consistent with metastatic melanoma. There is associated dermal fibrosis, scattered melanophages and perivascular chronic inflammation suggesting regression.     D. SKIN, LEFT MIDDLE THIGH IN-TRANSIT LESION, EXCISION:   --METASTATIC MELANOMA,  NOT PRESENT AT INKED SURGICAL MARGINS     E. SKIN, LEFT MEDIAL THIGH IN-TRANSIT LESION, EXCISION:    --METASTATIC MELANOMA, NOT PRESENT AT INKED SURGICAL MARGINS     F. SKIN, LEFT LOWER LEG ANTERIOR IN-TRANSIT LESION, EXCISION:    --METASTATIC MELANOMA WITH NECROSIS, PRESENT AT INKED SURGICAL MARGIN, SEE COMMENT     COMMENT: Two segments of tissue were received, one consisting of skin and the other consisting entirely of fibroadipose tissue. Melanoma is present in both segments  and involves an inked surgical margin within the segment of fibroadipose tissue.     G. SKIN, LEFT LOWER LEG POSTERIOR IN-TRANSIT LESION, EXCISION:   --METASTATIC MELANOMA WITH NECROSIS, EXTENDING < 0.1 MM FROM INKED SURGICAL MARGINS      COMMENT: There is an associated atypical cystic squamous proliferation without   epidermal involvement suggestive of follicular/cyst rupture.     H. SKIN, LEFT LOWER LEG INFERIOR IN-TRANSIT LESION, EXCISION:   --METASTATIC MELANOMA, 4.5  CM, EXTENDING < 0.1 MM FROM INKED SURGICAL MARGIN,   SEE COMMENT     COMMENT: Sections demonstrate a multinodular deposit of melanoma within subcutaneous adipose tissue, grossly 4.5 cm in greatest dimension, with necrosis and hemorrhage, extending  < 0.1 mm from an inked surgical margin. No associated lymph node tissue is seen. Differential considerations could include completely replaced matted lymph nodes (the exact number of which cannot be determined) and/or a soft tissue metastatic deposit.      Electronically Signed Out By DIAMOND TOVAR MD, Board Certified   Dermatopathologist/LAUREN   By the signature on this report, the individual or group listed as making the Final Interpretation/Diagnosis certifies that they have reviewed this case.  Diagnostic interpretation performed at [facility] [id number] [facility] [id number]      Assessment and Plan:   Assessment/Plan     Mr. Reilly Altamirano is a 68 y.o. male with a diagnosis of metastatic melanoma. Please see the evolution of the case listed above in the cancer history.     Today,   Mr. Altamirano was on the  phone and recovering in a rehab. He is unable to stand, but is able to sit up. We discussed the transfusion requirements and he informed me that the Rehab center will coordinate with [facility] for transfusion requirements.     Mr. Altamirano is really interested in pursuing treatment. I have not met him in a long time. Hence, I discussed that he should come to our clinic before we start discussing treatment. At this point, his physical strength is very poor and he is slowly regaining that. We discussed a wait and watch approach for now and will talk again in 4 weeks.      DISCLAIMER:   In preparing for this visit and writing this note, I reviewed all the previous electronic medical records (labs, imaging and medical charts) of the patient available in the physician portal. Significant findings which helped in decision making are recorded  in this chart.     The plan was discussed with the patient. We gave him ample opportunities to ask questions. All questions were answered to his satisfaction and he verbalized understanding.       Rick Meier MD    Hematology and Oncology     Phone: [phone]  Fax: [phone].

## 2023-11-23 NOTE — PROGRESS NOTES
.Patient ID: Reilly Altamirano is a 68 y.o. male.  Referring Physician: No referring provider defined for this encounter.  Primary Care Provider: Cristian Calix DO     Chief Complaint   Patient presents with    Follow-up     Re-establishing care    Melanoma          Cancer History:   Treatment Synopsis:   Referred by Dr. Wooten for adjuvant treatment of advanced stage III melanoma.     Mr. 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 He had originally presented in August of 2019 with an ulcerated wound which was managed as diabetic ulcer for almost 4-5  months. This left heel melanoma was 2.9mm Breslow depth. He has a history of right forearm lesion excised which was SCC and left cheek nevus- mild atypia. He underwent WLE on 01.31.2020 and then skin graft in early March 2020. He is staged at [id number]  ([facility]). The patient started adjuvant nivolumab in March 2020. In July 2020, he was admitted with diarrhea, nausea and vomiting. This was found to be secondary to C.Diff for which he received antibiotics. However, 2 weeks after since his diarrhea was not  resolving & C.diff was negative by PCR, he was started on high dose steroids which led to improvement in diarrhea. His wound in the heel was biopsied again on July 27, 2020. 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 These are being managed with localized  TVEC injections since Sept 2020 by Dr. Wooten. We restarted Nivolumab on Nov 19, 2020. He stopped this after Dec 3, 2020 for his surgery.     He came back to restart therapy on Feb 4, 2021 and got one dose. Following this was admitted in the hospital for almost a month. Diagnosed with Tenosynovitis- started on high dose steroids and did well on them. I met him in March 2021 and we came up with  a plan to taper off his steroids. However, soon after the taper he was again admitted to the hospital with pericarditis. This time rheumatology saw him and put him on low dose MTX. he also has active  intransit mets, but a PET scan on May 6, 2021 did not  identify any distant metastatic disease. He was dced home on high dose steroids and low dose MTX. I tapered the steroids slowly. He has already seen endocrinology, rheumatology and cardiology by now. I met him on 06/30/2021 and he informed me that Rheumatology  wants him to be prednisone 20mg by mouth daily. I deferred the steroid dosing to rheumatology. Endocrinology is already following him for blood sugar management. From Medical Oncology perspective, we decided that he will be on surveillance.      He met with Dr. Wooten on 10/26/2021 who saw some new in-transit mets. He biopsied those and they are positive for melanoma. NRAS positive. PET scan lit up in the left groin inguinal nodes. They have not been biopsied, but most likely are positive  for melanoma. I started MEKTOVI 45mg by mouth BID (after lot of deliberation for final assistance) in January end 2022. It was stopped in Feb 2022. He was admitted to  [facility] on Jan 25, 2022 and dced to SNF on Feb 9, 2022 (diagnosed with cellulitis  and confusion). He was dced to a SNF and came out of SNF after several weeks.      He visited me again on 4/6/22 and we ordered a PET scan and MRI brain to restage the disease. These were completed on 04/29/2022. MRI brain was unremarkable for any metastasis. PET scan showed hypermetabolic inguinal lymph node with mixed pattern. One  more subcutaneous nodule was also noted on the leg.  His case was discussed in the [facility] on 5/9/22 and a recommendation was made for TVEC. He was receiving TVEC with Dr. Wooten since then. However, a PET scan on 09/09/2022 showed enlarging lymph node  in the left groin. A tumor board discussion ensued on 09/12/22, and surgical options are still on the table. I discussed his case with Dr. Wooten and he will be going for surgical resection soon. I conveyed these reccs to him on 09/27/22.     He was taken to OR on 10/12/2022 and  "extensive resection was pursued. PET scan on 01/27/23 showed recurrent disease with potential mets to brain. I spoke to him on 02/01/2023 and ordered BRAIN MRI which was completed on 02/07/2023 which showed a new brain  metastasis in the frontal lobe. Mr. Altamirano met me in person on 02/15/2023- we referred him for GKRS to frontal lobe nodule. We reviewed the imaging together, and discussed potential treatments.     He met with the GKRS team on 02/21/2023. He completed GKRS on 03/02/2023. He started chemotherapy on 03/08/2023. After 3 rounds of chemotherapy, PET scan showed progressive disease and new in-transit mets appeared on the skin. He is now going for a Phase  1 clinical trial at [facility].     Mr. Altamirano went on to a TIL trial and had a prolonged course of admission at [facility]. Unfortunately, his disease kept progressing after the procedure. He also had several complications (details noted in discharge summary from [facility] (11/13/2023). He was discharged to a Rehab facility (The facility refused to transport the patient for clinic appointment on 11/22/23). We spoke on phone on 11/22/23.     Treatment History:   1. Nivolumab 240mg IVPB q2w- Start date- 03.19.2020 s/p 6 cycles switched to 480mg.  2. Nivolumab 480mg IVPB q4w- Switched from 240mg. Start date 06.11.2020. Held since July 9, 2020.  3. Nivolumab 240mg IVPB Q2w- restarted on 11/19/2020- today is C2 (12/3/2020)- patient stopped on his own accord after this.   4. MEKTOVI- Started in end of January 2022 and stopped after 4 weeks in Feb 2022- he was admitted to [facility] with sepsis and fatigue, but MEKTOVI was thought to the be the cause of this.      5. Chemotherapy- CVT regimen  C1- 03/08/2023  C2- 04/03/2023  C3- 04/19/2023- Dced after progressive disease is noted.    6. TIL therapy pursued at [facility] between July 2023 till Nov 2023. Not successful in controlling the disease.       Subjective   Please refer to \"Notes/Cancer History\" above for complete " History of present illness.     Mr Reilly Altamirano is recovering in the rehab center. He is able to sit with support, but still not able to stand. He wants to pursue treatment.      Review of Systems:   Review of Systems   Constitutional:  Positive for fatigue. Negative for appetite change, chills and fever.        Severe weakness and progressive disease.    HENT:   Negative for hearing loss, mouth sores, sore throat, trouble swallowing and voice change.    Eyes:  Negative for eye problems and icterus.   Respiratory:  Negative for chest tightness, hemoptysis and shortness of breath.    Cardiovascular:  Negative for chest pain and leg swelling.   Gastrointestinal:  Negative for blood in stool, constipation, diarrhea, nausea and vomiting.   Genitourinary:  Negative for difficulty urinating, dysuria, hematuria, nocturia and pelvic pain.    Musculoskeletal:  Negative for arthralgias, back pain, gait problem, myalgias and neck pain.   Skin:  Negative for itching and rash.   Neurological:  Negative for dizziness, gait problem, light-headedness, numbness and seizures.   Hematological:  Negative for adenopathy. Does not bruise/bleed easily.   Psychiatric/Behavioral:  Negative for confusion and depression. The patient is not nervous/anxious.          MEDICAL HISTORY  Past Medical History:   Diagnosis Date    Abnormal electromyogram (EMG)     Abnormal electromyogram (EMG)    Abnormal findings on diagnostic imaging of other abdominal regions, including retroperitoneum     Abnormal CT of the abdomen    Abnormal findings on diagnostic imaging of other specified body structures     Abnormal CT of the chest    Cardiomegaly 12/15/2020    Left ventricular hypertrophy    Cellulitis of left lower limb 12/08/2021    Cellulitis of left lower extremity    Cellulitis of left lower limb 05/23/2022    Cellulitis of left lower extremity    Chronic pain syndrome 06/24/2021    Chronic pain syndrome    Depression, unspecified 07/07/2021     Depressive disorder    Encounter for other preprocedural examination 07/07/2021    Preoperative clearance    Encounter for preprocedural cardiovascular examination 07/07/2021    Preoperative cardiovascular examination    Generalized anxiety disorder 09/01/2020    Generalized anxiety disorder    Long term (current) use of opiate analgesic 07/07/2021    Long term prescription opiate use    Neoplasm of unspecified behavior of digestive system 09/07/2021    Pancreatic neoplasm    Other conditions influencing health status 04/14/2021    Uncontrolled diabetes mellitus with diabetic neuropathy    Other mucopurulent conjunctivitis, unspecified eye 08/25/2020    Pink eye    Other neuromuscular dysfunction of bladder 12/30/2019    Frequency-urgency syndrome    Other specified disorders of adrenal gland (CMS/HCC) 07/07/2021    Adrenal mass    Other specified soft tissue disorders 11/16/2020    Redness and swelling of lower leg    Other specified symptoms and signs involving the digestive system and abdomen 07/16/2020    Abdominal weakness    Personal history of (healed) traumatic fracture 07/10/2020    History of fracture of rib    Personal history of diabetic foot ulcer 04/14/2021    History of diabetic ulcer of foot    Personal history of malignant melanoma of skin 05/15/2021    History of malignant melanoma of skin    Personal history of other diseases of the circulatory system     History of abnormal electrocardiography    Personal history of other diseases of the circulatory system 07/20/2021    History of congestive heart failure    Personal history of other endocrine, nutritional and metabolic disease 06/08/2021    History of type 2 diabetes mellitus    Personal history of other medical treatment     H/O Doppler ultrasound    Personal history of other medical treatment     History of echocardiogram    Personal history of other specified conditions 08/06/2021    History of urinary retention    Personal history of other  "specified conditions 12/30/2019    History of nocturia    Postprocedural seroma of a musculoskeletal structure following other procedure 02/13/2020    Postoperative seroma of musculoskeletal structure after non-musculoskeletal procedure    Type 2 diabetes mellitus with foot ulcer (CODE) (CMS/[facility]) 07/07/2021    Type 2 diabetes mellitus with foot ulcer, with long-term current use of insulin       FAMILY HISTORY  Family History   Problem Relation Name Age of Onset    Diabetes Other      Cancer Other         TOBACCO HISTORY  Tobacco Use: Low Risk  (6/14/2023)    Patient History     Smoking Tobacco Use: Never     Smokeless Tobacco Use: Never     Passive Exposure: Not on file       SOCIAL HISTORY  Social Connections: Not on file        Outpatient Medication Profile:  Current Outpatient Medications on File Prior to Visit   Medication Sig Dispense Refill    amLODIPine (Norvasc) 10 mg tablet TAKE ONE TABLET BY MOUTH DAILY 90 tablet 3    aspirin 81 mg EC tablet Take 1 tablet (81 mg) by mouth once daily.      atorvastatin (Lipitor) 20 mg tablet Take 1 tablet (20 mg) by mouth once daily at bedtime.      BD Jackie 2nd Gen Pen Needle 32 gauge x 5/32\" needle to be used with insulin 4 times per day.      Blood Glucose Test strip DISPENSE BRAND PREFERRED BY INSURANCE USE TO TEST BLOOD SUGAR 3-4 TIMES DAILY FOR INSULIN DEPEND DIABETES      carvedilol (Coreg) 6.25 mg tablet Take 1 tablet (6.25 mg) by mouth 2 times a day with meals.      cyanocobalamin (Vitamin B-12) 1,000 mcg tablet Take 1 tablet (1,000 mcg) by mouth once daily.      DULoxetine (Cymbalta) 30 mg DR capsule TAKE ONE CAPSULE BY MOUTH DAILY. TAKE WITH 60MG DOSE FOR TOTAL OF 90MG DAILY 90 capsule 0    DULoxetine (Cymbalta) 60 mg DR capsule Take 1 capsule (60 mg) by mouth once daily. 90 capsule 3    folic acid (Folvite) 1 mg tablet Take 1 tablet (1 mg) by mouth once daily.      FreeStyle Lancets 28 gauge USE TO CHECK BLOOD SUGAR THREE TO FOUR TIMES DAILY      furosemide " (Lasix) 20 mg tablet Take 1 tablet (20 mg) by mouth once daily.      HumaLOG KwikPen Insulin 100 unit/mL injection INJECT 8 UNITS SUBCUTANEOUSLY 3 TIMES DAILY PLUS SLIDING SCALE AS DIRECTED.  MAX OF 66 UNITS PER DAY.      ibuprofen 600 mg tablet Take 1 tablet (600 mg) by mouth 3 times a day as needed.      insulin glargine (Lantus) 100 unit/mL injection Inject 35 Units under the skin once daily at bedtime.      insulin lispro (HumaLOG) 100 unit/mL injection Inject SSI TID and at -200=4u, 201-250=6units, 251-300=8u, 301-350=10u, 351-400=12 units, if>400 call MD      isosorbide mononitrate ER (Imdur) 30 mg 24 hr tablet Take 1 tablet (30 mg) by mouth once daily in the morning. Take before meals. 90 tablet 3    LORazepam (Ativan) 1 mg tablet take 1 tablet by mouth once a day as needed for anxiety      magnesium oxide (Mag-Ox) 400 mg (241.3 mg magnesium) tablet TAKE ONE TABLET BY MOUTH DAILY 90 tablet 0    ondansetron (Zofran) 8 mg tablet Take 1 tablet (8 mg) by mouth every 8 hours if needed for nausea or vomiting.      prochlorperazine (Compazine) 10 mg tablet Take 1 tablet (10 mg) by mouth every 6 hours.      tamsulosin (Flomax) 0.4 mg 24 hr capsule Take 1 capsule (0.4 mg) by mouth once daily.      zinc gluconate 50 mg tablet Take 1 tablet (50 mg) by mouth once daily.       No current facility-administered medications on file prior to visit.         Performance Status:  Symptomatic; in bed >50% of the day     Vitals and Measurements:   There were no vitals taken for this visit.      Physical Exam:   Physical Exam         Lab Results:  I have reviewed these laboratory results:     Lab Results   Component Value Date    WBC 8.6 04/24/2023    HGB 11.1 (L) 04/24/2023    HCT 32.8 (L) 04/24/2023    MCV 91 04/24/2023     04/24/2023         Chemistry    Lab Results   Component Value Date/Time     04/24/2023 0915    K 4.7 04/24/2023 0915     04/24/2023 0915    CO2 23 04/24/2023 0915    BUN 26 (H)  04/24/2023 0915    CREATININE 1.17 04/24/2023 0915    Lab Results   Component Value Date/Time    CALCIUM 8.8 04/24/2023 0915    ALKPHOS 113 04/24/2023 0915    AST 12 04/24/2023 0915    ALT 15 04/24/2023 0915    BILITOT 0.5 04/24/2023 0915            Lab Results   Component Value Date    TSH 3.24 04/03/2023    THYROIDPAB <28 02/26/2021        Radiology Result:  I have reviewed the latest Imaging in PACS and the findings are noted in this note. I discussed the results of the latest imaging with the patient. All previous imaging were reviewed at the time it was completed. Full records are available in the EMR for review as well.     === 05/22/23 ===    CT CHEST ABDOMEN PELVIS W IV CONTRAST    - Impression -  CHEST:    Multiple bilateral pulmonary nodules stable to slightly increased in  size since PET-CT 05/05/2023. No obvious interval change in  heterogenous likely hollis masses in the inferior right  hilum/mediastinum.    ABDOMEN AND PELVIS:    Prominent soft tissue thickening in the left inguinal region,  possible local disease recurrence, with adenopathy in the left  pelvis, multiple presumed metastatic lesions involving the liver and  right adrenal gland, multiple peritoneal nodules and nodular presumed  metastasis near the umbilicus.    Hypodense lesion in the body of the pancreas. This could be  metastatic or unrelated cystic neoplasm or pseudocyst.    Nonobstructive left kidney stone. Higher than simple fluid density  left renal lesion. Differential includes cystic metastasis and  proteinaceous/hemorrhagic cyst.    Contracted thick-walled gallbladder.    Postop and degenerative changes of the spine.    Other findings as described above.    === 03/02/23 ===    MR GAMMA KNIFE TREATMENT PLANNING BRAIN MRI W OR WO CONTRAST    - Impression -  Volumetric MRI performed for therapy planning purposes with findings  as described above.           Pathology Results:  I have reviewed the full pathology report recorded in the  EMR. The pertinent portions indicating diagnosis are listed here in the note. for details please refer to the full report recorded in the EMR.    Surgical Pathology [Oct 28 2022 3:04PM] ([id number])     Specimens: LEFT PELVIC LYMPH NODE CONTENTS /LEFT INGUINAL LYMPH NODE CONTENTS, STITCH MARKED SAPHENOUS VEIN, SINGLE/LEFT LATERAL THIGH IN-TRANSIT LESION /LEFT MIDDLE THIGH IN-TRANSIT LESION /LEFT MEDIAL THIGH IN-TRANSIT LESION /LEFT LOWER LEG ANTERIOR  IN-TRANSIT LESION /LEFT LOWER LEG POSTERIOR IN-TRANSIT LESION /LEFT LOWER LEG INFERIOR IN-TRANSIT LESION     Name RUMA ALVAREZ     Accession #: [id number]   Pathologist: DIAMOND TOVAR MD, Board Certified   Dermatopathologist    Date of Procedure: 10/12/2022   Date Received: 10/12/2022   Date Reported 10/28/2022   Submitting Physician: MIGDALIA CERDA MD   Location: [facility] Surg   Copy To/Referring/Attending:   FELIZ NICHOLSON MD Other External  #     FINAL DIAGNOSIS   A. SPECIMEN LABELED LEFT PELVIC LYMPH NODE CONTENTS, EXCISION:   --METASTATIC MELANOMA INVOLVING 3 OF 14 LYMPHS (3/14)   --SUBCAPSULAR AND PARENCHYMAL TUMOR DEPOSITS   --LARGEST DEPOSIT APPROXIMATELY 15 MM  IN GREATEST DIMENSION   --EXTRACAPSULAR EXTENSION PRESENT   --PERINODAL LYMPHOVASCULAR INVASION PRESENT     COMMENT: Routine and immunostained sections including Melan-A and SOX-10 with adequate controls were reviewed.     B. SPECIMEN  LABELED LEFT INGUINAL LYMPH NODE CONTENTS, EXCISION:   --METASTATIC MELANOMA, 8.5 CM, PRESENT AT SURGICAL MARGINS, SEE COMMENT   --METASTATIC MELANOMA PRESENT IN 3 OF 4 LYMPH NODES (3/4), SEE COMMENT   --SUBCAPSULAR AND PARENCHYMAL TUMOR DEPOSITS    --LARGEST DEPOSIT AT LEAST 10 MM IN GREATEST DIMENSION   --NO EXTRACAPSULAR EXTENSION SEEN   --SAPHENOUS VEIN MARGIN NEGATIVE FOR MELANOMA   --PRIOR SURGICAL SITE CHANGES     COMMENT: Gross examination identified a mass measuring 8.5  cm in greatest dimension and four definitive lymph nodes. Microscopically,  the mass consists of multinodular, partially necrotic metastatic melanoma involving the dermis and subcutaneous adipose tissue without associated lymph node tissue, focally present  at surgical margins. Differential considerations for this finding could include completely replaced matted lymphnodes (the exact number of which cannot be determined) and/or soft tissue metastatic deposits. Metastatic melanoma is also present in three  of the four separately identified lymph nodes.     Routine and immunostained sections including Melan-A and SOX-10 with adequate   controls were reviewed.     C. SKIN, LEFT LATERAL THIGH IN-TRANSIT LESION, EXCISION:   --METASTATIC  MELANOMA, NOT PRESENT AT INKED SURGICAL MARGINS     COMMENT: Routine and immunostained sections including Melan-A and SOX-10 with adequate controls were reviewed. There are focal single and nested atypical dermal melanocytes identified by immunohistochemistry  consistent with metastatic melanoma. There is associated dermal fibrosis, scattered melanophages and perivascular chronic inflammation suggesting regression.     D. SKIN, LEFT MIDDLE THIGH IN-TRANSIT LESION, EXCISION:   --METASTATIC MELANOMA,  NOT PRESENT AT INKED SURGICAL MARGINS     E. SKIN, LEFT MEDIAL THIGH IN-TRANSIT LESION, EXCISION:   --METASTATIC MELANOMA, NOT PRESENT AT INKED SURGICAL MARGINS     F. SKIN, LEFT LOWER LEG ANTERIOR IN-TRANSIT LESION, EXCISION:    --METASTATIC MELANOMA WITH NECROSIS, PRESENT AT INKED SURGICAL MARGIN, SEE COMMENT     COMMENT: Two segments of tissue were received, one consisting of skin and the other consisting entirely of fibroadipose tissue. Melanoma is present in both segments  and involves an inked surgical margin within the segment of fibroadipose tissue.     G. SKIN, LEFT LOWER LEG POSTERIOR IN-TRANSIT LESION, EXCISION:   --METASTATIC MELANOMA WITH NECROSIS, EXTENDING < 0.1 MM FROM INKED SURGICAL MARGINS      COMMENT: There is an associated atypical cystic  squamous proliferation without   epidermal involvement suggestive of follicular/cyst rupture.     H. SKIN, LEFT LOWER LEG INFERIOR IN-TRANSIT LESION, EXCISION:   --METASTATIC MELANOMA, 4.5  CM, EXTENDING < 0.1 MM FROM INKED SURGICAL MARGIN,   SEE COMMENT     COMMENT: Sections demonstrate a multinodular deposit of melanoma within subcutaneous adipose tissue, grossly 4.5 cm in greatest dimension, with necrosis and hemorrhage, extending  < 0.1 mm from an inked surgical margin. No associated lymph node tissue is seen. Differential considerations could include completely replaced matted lymph nodes (the exact number of which cannot be determined) and/or a soft tissue metastatic deposit.      Electronically Signed Out By DIAMOND TOVAR MD, Board Certified   Dermatopathologist/LAUREN   By the signature on this report, the individual or group listed as making the Final Interpretation/Diagnosis certifies that they have reviewed this case.  Diagnostic interpretation performed at [facility] [address]   [place] [facility] [id number]      Assessment and Plan:   Assessment/Plan      Mr. Reilly Altamirano is a 68 y.o. male with a diagnosis of metastatic melanoma. Please see the evolution of the case listed above in the cancer history.     Today,   Mr. Altamirano was on the phone and recovering in a rehab. He is unable to stand, but is able to sit up. We discussed the transfusion requirements and he informed me that the Rehab center will coordinate with [facility] for transfusion requirements.     Mr. Altamirano is really interested in pursuing treatment. I have not met him in a long time. Hence, I discussed that he should come to our clinic before we start discussing treatment. At this point, his physical strength is very poor and he is slowly regaining that. We discussed a wait and watch approach for now and will talk again in 4 weeks.      DISCLAIMER:   In preparing for this visit and writing this note, I reviewed all the  previous electronic medical records (labs, imaging and medical charts) of the patient available in the physician portal. Significant findings which helped in decision making are recorded  in this chart.     The plan was discussed with the patient. We gave him ample opportunities to ask questions. All questions were answered to his satisfaction and he verbalized understanding.       Rick Meier MD    Hematology and Oncology     Phone: [phone]  Fax: [phone].

## 2023-12-02 PROBLEM — N39.0 URINARY TRACT INFECTION WITHOUT HEMATURIA, SITE UNSPECIFIED: Status: ACTIVE | Noted: 2023-01-01

## 2023-12-02 NOTE — H&P
elizabeth Of Present Illness  Reilly Altamirano is a 68 y.o. male with past medical history of extensive metastatic melanoma including mets to the brain abdomen lungs presented to the emergency room due to decreased mental status and for loss of IV access for treatment of UTI with meropenem.  Patient has just come to this area from treatment at [facility].  Patient is unable to provide me any information but he did just receive morphine in minutes before I saw him.  All information is from ER physician comments as well as old records available to us.  Apparently patient was recommended to pursue palliative care and/or hospice care from his treating physicians at [facility].  Patient has come searching for continued hope and treatment at another facility.  Patient had been seen actually at [facility] last week and started on Bactrim for a UTI and sent back to the nursing facility.  He was placed on meropenem and had PICC line put in place.  Patient became confused and accidentally pulled his central line and subsequently is being brought in for IV access and for further evaluation.  Patient had a telephone visit with Dr. Meeir at [facility] to re-establish November 22.  Please refer to that note for details of patient's cancer diagnosis and treatment.  Upon arrival to the emergency room today patient's blood pressure was 96/78 pulse of 137.  Patient's was treated with IV fluid resuscitation with significant improvement of his blood pressure as well as his pulse.  Blood work shows glucose of 264 nonfasting state.  BMP significant for a bicarb of only 20 anion gap 16 with patient's lactic acid initially of 2.2 but after hydration 1.6.  BUN is 32 and creatinine of 1.15 with baseline creatinine approximately 1.0.  White count 7200 hemoglobin of 8.9 platelets of only 46,000.  Venous gases pH is 7.43 with pCO2 of 31.  Urinalysis is positive for protein glucose blood ketone  leukocyte Estrace.  Patient has 11-20 RBCs 6-10 WBCs 2+ bacteria.  Imaging shows extensive adenopathy in the thoracic cavity.  Patient has enlarging nodes 4.6 x 4.3 cm in size right infrahilar mass measuring now 4.8 x 4.6 cm in size innumerable pulmonary nodules severely increased from previous studies.  Liver has marked abnormal liver with multiple solid masses present new and enlarged from previous studies.  Pancreas has a heterogeneous decreased density in the region of the pancreatic head near the junction of the head and the body.  Nonspecific but cannot exclude tumor.  Right adrenal mass increasing in size appears to be invading adjacent liver.  Patient has evidence of new mild left hydronephrosis with a left ureter traced down into an area of abnormal soft tissue likely a tumor left side of the pelvis which is probably obstructing the ureter.  Patient with large irregular area of abnormal soft tissue density occupying much of the left side of the pelvis most likely appears to be tumor significant increase from previous studies likely causing obstruction of the left distal ureter and encases the iliac vessels..  Patient is being admitted to the hospital for what appears to be potential of early sepsis from UTI with patient having evidence of obstructed left ureter with mild hydronephrosis.  Patient with quite extensive metastatic melanoma including mets to the brain liver lungs and pelvis.  Patient will be established with oncology for further recommendations and stabilization prior to following up with [facility] oncology.  Patient will be continued on meropenem for his UTI with supportive IV fluids supportive care otherwise.  Patient's active CODE STATUS is full code as family is not ready to accept palliative care at this present time.     Past Medical History  He has a past medical history of Abnormal electromyogram (EMG), Abnormal findings on diagnostic imaging of other abdominal regions, including  retroperitoneum, Abnormal findings on diagnostic imaging of other specified body structures, Cardiomegaly (12/15/2020), Cellulitis of left lower limb (12/08/2021), Cellulitis of left lower limb (05/23/2022), Chronic pain syndrome (06/24/2021), Depression, unspecified (07/07/2021), Encounter for other preprocedural examination (07/07/2021), Encounter for preprocedural cardiovascular examination (07/07/2021), Generalized anxiety disorder (09/01/2020), Long term (current) use of opiate analgesic (07/07/2021), Neoplasm of unspecified behavior of digestive system (09/07/2021), Other conditions influencing health status (04/14/2021), Other mucopurulent conjunctivitis, unspecified eye (08/25/2020), Other neuromuscular dysfunction of bladder (12/30/2019), Other specified disorders of adrenal gland (CMS/[facility]) (07/07/2021), Other specified soft tissue disorders (11/16/2020), Other specified symptoms and signs involving the digestive system and abdomen (07/16/2020), Personal history of (healed) traumatic fracture (07/10/2020), Personal history of diabetic foot ulcer (04/14/2021), Personal history of malignant melanoma of skin (05/15/2021), Personal history of other diseases of the circulatory system, Personal history of other diseases of the circulatory system (07/20/2021), Personal history of other endocrine, nutritional and metabolic disease (06/08/2021), Personal history of other medical treatment, Personal history of other medical treatment, Personal history of other specified conditions (08/06/2021), Personal history of other specified conditions (12/30/2019), Postprocedural seroma of a musculoskeletal structure following other procedure (02/13/2020), and Type 2 diabetes mellitus with foot ulcer (CODE) (CMS/[facility]) (07/07/2021).    Surgical History  He has a past surgical history that includes Knee surgery (03/02/2018); Other surgical history (04/06/2020); Other surgical history (08/21/2020); Other surgical history  (08/21/2020); Other surgical history (04/14/2021); and US guided abscess drain (11/17/2022).     Social History  He reports that he has never smoked. He has never used smokeless tobacco. He reports that he does not currently use alcohol. He reports that he does not use drugs.    Family History  Family History   Problem Relation Name Age of Onset    Diabetes Other      Cancer Other          Allergies  Bee venom protein (honey bee)    Meds    Current Facility-Administered Medications:     heparin (porcine) injection 5,000 Units, 5,000 Units, subcutaneous, q8h, Chao Goldberg MD, 5,000 Units at 12/02/23 0637    melatonin tablet 6 mg, 6 mg, oral, Nightly, Cat Avelar MD, 6 mg at 12/02/23 0236    Review of Systems  Patient was quite somnolent and unable to give review of systems as he had received some narcotic for pain control      Physical Exam  Constitutional:       Appearance: He is ill-appearing.   HENT:      Head: Normocephalic and atraumatic.      Mouth/Throat:      Mouth: Mucous membranes are moist.   Eyes:      Extraocular Movements: Extraocular movements intact.      Pupils: Pupils are equal, round, and reactive to light.   Cardiovascular:      Rate and Rhythm: Normal rate and regular rhythm.      Pulses: Normal pulses.      Heart sounds: Normal heart sounds. No murmur heard.  Pulmonary:      Effort: Pulmonary effort is normal. No respiratory distress.      Breath sounds: Normal breath sounds. No stridor. No wheezing, rhonchi or rales.   Abdominal:      General: Abdomen is flat. Bowel sounds are normal.      Palpations: Abdomen is soft.      Tenderness: There is abdominal tenderness. There is no guarding or rebound.   Musculoskeletal:         General: Normal range of motion.      Cervical back: Normal range of motion and neck supple. No rigidity.      Left lower leg: Edema present.   Skin:     Coloration: Skin is not jaundiced.      Findings: Lesion (Wound of left heel surgical site healing over)  present. No bruising, erythema or rash.   Neurological:      Comments: Unable to assess secondary to sedation from narcotic administration   Psychiatric:      Comments: Unable to assess secondary to sedation from narcotic administration          Last Recorded Vitals  /54 (BP Location: Left arm, Patient Position: Lying)   Pulse 93   Temp 36.5 °C (97.7 °F) (Skin)   Resp 18   Wt 121 kg (266 lb 15.6 oz)   SpO2 92%     Relevant Results     Results for orders placed or performed during the hospital encounter of 12/01/23 (from the past 24 hour(s))   CBC and Auto Differential   Result Value Ref Range    WBC 7.2 4.4 - 11.3 x10*3/uL    nRBC 0.0 0.0 - 0.0 /100 WBCs    RBC 2.62 (L) 4.50 - 5.90 x10*6/uL    Hemoglobin 8.9 (L) 13.5 - 17.5 g/dL    Hematocrit 29.0 (L) 41.0 - 52.0 %     (H) 80 - 100 fL    MCH 34.0 26.0 - 34.0 pg    MCHC 30.7 (L) 32.0 - 36.0 g/dL    RDW 23.6 (H) 11.5 - 14.5 %    Platelets 46 (L) 150 - 450 x10*3/uL    Neutrophils % 68.0 40.0 - 80.0 %    Immature Granulocytes %, Automated 5.0 (H) 0.0 - 0.9 %    Lymphocytes % 15.5 13.0 - 44.0 %    Monocytes % 10.8 2.0 - 10.0 %    Eosinophils % 0.4 0.0 - 6.0 %    Basophils % 0.3 0.0 - 2.0 %    Neutrophils Absolute 4.93 1.20 - 7.70 x10*3/uL    Immature Granulocytes Absolute, Automated 0.36 0.00 - 0.70 x10*3/uL    Lymphocytes Absolute 1.12 (L) 1.20 - 4.80 x10*3/uL    Monocytes Absolute 0.78 0.10 - 1.00 x10*3/uL    Eosinophils Absolute 0.03 0.00 - 0.70 x10*3/uL    Basophils Absolute 0.02 0.00 - 0.10 x10*3/uL   Comprehensive Metabolic Panel   Result Value Ref Range    Glucose 264 (H) 74 - 99 mg/dL    Sodium 142 136 - 145 mmol/L    Potassium 4.5 3.5 - 5.3 mmol/L    Chloride 111 (H) 98 - 107 mmol/L    Bicarbonate 20 (L) 21 - 32 mmol/L    Anion Gap 16 10 - 20 mmol/L    Urea Nitrogen 32 (H) 6 - 23 mg/dL    Creatinine 1.15 0.50 - 1.30 mg/dL    eGFR 68 >60 mL/min/1.73m*2    Calcium 8.1 (L) 8.6 - 10.3 mg/dL    Albumin 3.2 (L) 3.4 - 5.0 g/dL    Alkaline Phosphatase  539 (H) 33 - 136 U/L    Total Protein 5.2 (L) 6.4 - 8.2 g/dL    AST 23 9 - 39 U/L    Bilirubin, Total 1.0 0.0 - 1.2 mg/dL    ALT 22 10 - 52 U/L   Lactate   Result Value Ref Range    Lactate 2.2 (H) 0.4 - 2.0 mmol/L   BLOOD GAS MIXED VENOUS FULL PANEL   Result Value Ref Range    POCT pH, Mixed 7.43 7.33 - 7.43 pH    POCT pCO2, Mixed 31 (L) 41 - 51 mm Hg    POCT pO2, Mixed 38 35 - 45 mm Hg    POCT SO2, Mixed 48 45 - 75 %    POCT Oxy Hemoglobin, Mixed 46.7 45.0 - 75.0 %    POCT Hematocrit Calculated, Mixed 26.0 (L) 41.0 - 52.0 %    POCT Sodium, Mixed 142 136 - 145 mmol/L    POCT Potassium, Mixed 4.3 3.5 - 5.3 mmol/L    POCT Chloride, Mixed 110 (H) 98 - 107 mmol/L    POCT Ionized Calcium, Mixed 1.16 1.10 - 1.33 mmol/L    POCT Glucose, Mixed 301 (H) 74 - 99 mg/dL    POCT Lactate, Mixed 2.5 (H) 0.4 - 2.0 mmol/L    POCT Base Excess, Mixed -3.2 (L) -2.0 - 3.0 mmol/L    POCT HCO3 Calculated, Mixed 20.6 (L) 22.0 - 26.0 mmol/L    POCT Hemoglobin, Mixed 8.5 (L) 13.5 - 17.5 g/dL    POCT Anion Gap, Mixed 16 10 - 25 mmo/L    Patient Temperature 37.0 degrees Celsius    FiO2 24 %   Troponin I, High Sensitivity, Initial   Result Value Ref Range    Troponin I, High Sensitivity 24 (H) 0 - 20 ng/L   Morphology   Result Value Ref Range    RBC Morphology See Below     Hypochromia Mild     Teardrop Cells Few    Urinalysis with Reflex Microscopic and Culture   Result Value Ref Range    Color, Urine Red (N) Straw, Yellow    Appearance, Urine Cloudy (N) Clear    Specific Gravity, Urine >1.030 (N) 1.005 - 1.035    pH, Urine 5.5 5.0, 5.5, 6.0, 6.5, 7.0, 7.5, 8.0    Protein, Urine 300 (3+) (A) NEGATIVE, 10 (TRACE) mg/dL    Glucose, Urine 70 (1+) (A) NEGATIVE mg/dL    Blood, Urine 1.0 (3+) (A) NEGATIVE    Ketones, Urine 5 (TRACE) (A) NEGATIVE mg/dL    Bilirubin, Urine 0.5 (1+) (A) NEGATIVE    Urobilinogen, Urine 8 (3+) (N) NORM mg/dL    Nitrite, Urine NEGATIVE NEGATIVE    Leukocyte Esterase, Urine 75 Neal/µL (A) NEGATIVE   Extra Urine Gray Tube    Result Value Ref Range    Extra Tube Hold for add-ons.    Microscopic Only, Urine   Result Value Ref Range    WBC, Urine 6-10 (A) 1-5, NONE /HPF    RBC, Urine 11-20 (A) NONE, 1-2, 3-5 /HPF    Bacteria, Urine 2+ (A) NONE SEEN /HPF    Mucus, Urine 1+ Reference range not established. /LPF    Hyaline Casts, Urine 1+ (A) NONE /LPF    Uric Acid Crystals, Urine 1+ NONE, 1+ /HPF   Blood Gas Lactic Acid, Venous   Result Value Ref Range    POCT Lactate, Venous 1.8 0.4 - 2.0 mmol/L   Lactate   Result Value Ref Range    Lactate 1.6 0.4 - 2.0 mmol/L   Creatinine, Serum   Result Value Ref Range    Creatinine 1.12 0.50 - 1.30 mg/dL    eGFR 71 >60 mL/min/1.73m*2   Troponin, High Sensitivity, 1 Hour   Result Value Ref Range    Troponin I, High Sensitivity 22 (H) 0 - 20 ng/L   Urinalysis with Reflex Microscopic and Culture   Result Value Ref Range    Color, Urine Anjelica (N) Straw, Yellow    Appearance, Urine Hazy (N) Clear    Specific Gravity, Urine 1.024 1.005 - 1.035    pH, Urine 5.0 5.0, 5.5, 6.0, 6.5, 7.0, 7.5, 8.0    Protein, Urine 100 (2+) (N) NEGATIVE mg/dL    Glucose, Urine >=500 (3+) (A) NEGATIVE mg/dL    Blood, Urine SMALL (1+) (A) NEGATIVE    Ketones, Urine 5 (TRACE) (A) NEGATIVE mg/dL    Bilirubin, Urine MODERATE (2+) (A) NEGATIVE    Urobilinogen, Urine 4.0 (N) <2.0 mg/dL    Nitrite, Urine NEGATIVE NEGATIVE    Leukocyte Esterase, Urine SMALL (1+) (A) NEGATIVE   Extra Urine Gray Tube   Result Value Ref Range    Extra Tube Hold for add-ons.    Microscopic Only, Urine   Result Value Ref Range    WBC, Urine 21-50 (A) 1-5, NONE /HPF    WBC Clumps, Urine MODERATE Reference range not established. /HPF    RBC, Urine >20 (A) NONE, 1-2, 3-5 /HPF    Squamous Epithelial Cells, Urine 1-9 (SPARSE) Reference range not established. /HPF    Bacteria, Urine 1+ (A) NONE SEEN /HPF    Mucus, Urine 1+ Reference range not established. /LPF    Hyaline Casts, Urine 4+ (A) NONE /LPF    Amorphous Crystals, Urine 1+ NONE, 1+, 2+ /HPF   Coagulation  Screen   Result Value Ref Range    Protime 12.7 9.8 - 12.8 seconds    INR 1.1 0.9 - 1.1    aPTT 28 27 - 38 seconds        Recent Imaging  CT angio chest for pulmonary embolism    Result Date: 12/2/2023  STUDY: CT Angiogram of the Chest, CT Abdomen and Pelvis with IV Contrast; 12/2/2023 at 1:07 a.m. INDICATION: Abdominal pain medial to ostomy site.  Additional History:  Melanoma. COMPARISON: CT CAP 5/22/2023.  CTA chest 1/18/2021. ACCESSION NUMBER(S): [id number], [id number] ORDERING CLINICIAN: NINI KILPATRICK TECHNIQUE:  CTA of the chest was performed following rapid injection of intravenous contrast.  Images are reviewed and processed at a workstation according to the CT angiogram protocol with 3-D and/or MIP post processing imaging generated.  CT of the abdomen and pelvis was performed with intravenous contrast.  Omnipaque 350 100 mL was administered intravenously; positive oral contrast was given. Automated mA/kV exposure control was utilized and patient examination was performed in strict accordance with principles of ALARA. FINDINGS:  CTA CHEST: Adenopathy in the hilar regions distorts the pulmonary vessels. Pulmonary arterial enhancement is inhomogeneous.  I do not see clear evidence of pulmonary emboli.  In the region of the left hilum there may be some tumor invading the pulmonary veins and possibly the pulmonary arteries. The thoracic aorta is normal in course and caliber without dissection or aneurysm. There is a vascular access port over the right side of the chest.  The catheter enters via a jugular approach and extends to the SVC.  The heart is normal in size without pericardial effusion.  Mildly enlarged left axillary lymph nodes, new from prior.  Extensive abnormal adenopathy in the chest, markedly increased from prior.  A subcarinal lymph node which previously measured 3.1 x 2.3 cm currently measures 4.6 x 4.3 cm and there are multiple additional enlarged nodes, many of which are new from  prior. There is no pleural effusion, pleural thickening, or pneumothorax.  A right infrahilar mass which previously measured 3.9 x 3.8 cm currently measures 4.8 x 4.6 cm.  The patient has innumerable pulmonary nodules which have increased markedly from prior.  Bibasilar pulmonary opacities, most suggestive of atelectasis. ABDOMEN:  LIVER: Markedly abnormal liver with multiple solid masses present, new and enlarged from prior.  Detailed evaluation is hindered by artifact on this scan but there are liver lesions measuring up to 12 cm present.  BILE DUCTS: No intrahepatic or extrahepatic biliary ductal dilatation.  GALLBLADDER: The gallbladder is is distended, but otherwise not grossly remarkable in appearance.  STOMACH: No abnormalities identified.  PANCREAS: Heterogeneous decreased density in the region of the pancreatic head and near the junction of the head and body.  This is difficult to measure, but seems similar to the prior study.  This is nonspecific. I cannot exclude tumor.  No ductal dilatation.  SPLEEN: No splenomegaly or focal splenic lesion.  ADRENAL GLANDS: There is a right adrenal mass which has increased in size and now appears to be invading the adjacent liver.  KIDNEYS AND URETERS: Kidneys are normal in size and location.  Bilateral renal cysts, left greater than right.  Small nonobstructing calculus in the lower pole of the left kidney.  No right hydronephrosis but there appears to be new mild left hydronephrosis.  The left ureter can be traced down into an area of abnormal soft tissue, likely tumor, in the left side of the pelvis which is probably obstructing the ureter.  I do not see a definite ureteral calculus.  PELVIS:  BLADDER: There is a Mays catheter in the bladder and the bladder is decompressed.  The wall of the urinary bladder is diffusely thickened.  REPRODUCTIVE ORGANS: No abnormalities identified.  BOWEL: No abnormalities identified.  VESSELS: No abnormalities identified.  Abdominal  aorta is normal in caliber.  PERITONEUM/RETROPERITONEUM/LYMPH NODES: No free fluid.  No pneumoperitoneum. Numerous mildly enlarged retroperitoneal lymph nodes have increased in size.  Numerous immediately enlarged iliac lymph nodes bilaterally, left greater than right.  These are somewhat confluent and difficult to measure.  Abnormal enlarged right inguinal lymph nodes have increased from prior.  ABDOMINAL WALL: There is abnormal soft tissue adjacent to the umbilicus which is likely tumor, previously measuring 2.9 x 1.8 cm and currently measuring 3.4 x 3.4 cm. SOFT TISSUES: There is a large irregular area of abnormal soft tissue density occupying much of the left side of the pelvis.  This appears to be tumor, significantly increased from prior.  This is likely occluding the distal left ureter.  This encases the iliac vessels.  The iliac arteries are probably patent.  I do not have sufficient contrast to adequately evaluate the iliac veins but suspect the left iliac veins are either occluded or significantly compressed.  There does appear to be significant associated soft tissue swelling/edema in the visualized left upper leg.  The patient also has extensive tumor in the left inguinal region extending to the skin surface.  This has increased markedly from prior. There are probable mesenteric implants of tumor throughout the abdomen/pelvis, increased from prior.  BONES: Old-appearing bilateral rib fractures.  Degenerative changes in the spine.  Postoperative changes in the thoracolumbar spine with pedicle screws and posterior fusion hardware present.  Postoperative changes in the spine.  There are pedicle screws and posterior fusion rods spanning T11-L2.  Alignment is unremarkable.  Stable compression of the L1 vertebral body with mild posterior displacement of the vertebral body narrowing the canal.  There has been posterior decompressive surgery at this level.  No bony destructive lesions are seen with  certainty.    Extensive metastatic disease in the chest, abdomen, and pelvis, markedly increased from prior imaging of 5/22/2023. No clear evidence of pulmonary embolism although patient factors hinder the sensitivity of this study.  Tumor in the hilar regions encases and distorts the pulmonary vessels and in the left hilum there may be some direct invasion of pulmonary veins or pulmonary arteries. Mildly increased markings in the lung bases, likely atelectasis.  I cannot exclude mild basilar infiltrates. Normal heart size.  Normal enhancement of the aorta. Degenerative and postoperative changes in the spine with posterior fusion hardware spanning T11-L2.  Stable compression fracture at L1. Old-appearing bilateral rib fractures. New left hydronephrosis, likely due to tumor in the left side of the pelvis which has increased markedly.  There is a nonobstructing calculus in the lower pole of the left kidney but no ureteral calculi are apparent. There is a Mays catheter in the bladder and the bladder is decompressed.  Abnormal diffuse thickening of the wall of the urinary bladder. Tumor in the left side of the pelvis encases the iliac vessels.  I suspect the left iliac veins are occluded or significantly compressed and there appears to be associated edema in the visualized left leg. Signed by Garret Gomez MD    CT abdomen pelvis w IV contrast    Result Date: 12/2/2023  STUDY: CT Angiogram of the Chest, CT Abdomen and Pelvis with IV Contrast; 12/2/2023 at 1:07 a.m. INDICATION: Abdominal pain medial to ostomy site.  Additional History:  Melanoma. COMPARISON: CT CAP 5/22/2023.  CTA chest 1/18/2021. ACCESSION NUMBER(S): [id number], [id number] ORDERING CLINICIAN: NINI KILPATRICK TECHNIQUE:  CTA of the chest was performed following rapid injection of intravenous contrast.  Images are reviewed and processed at a workstation according to the CT angiogram protocol with 3-D and/or MIP post processing imaging generated.   CT of the abdomen and pelvis was performed with intravenous contrast.  Omnipaque 350 100 mL was administered intravenously; positive oral contrast was given. Automated mA/kV exposure control was utilized and patient examination was performed in strict accordance with principles of ALARA. FINDINGS:  CTA CHEST: Adenopathy in the hilar regions distorts the pulmonary vessels. Pulmonary arterial enhancement is inhomogeneous.  I do not see clear evidence of pulmonary emboli.  In the region of the left hilum there may be some tumor invading the pulmonary veins and possibly the pulmonary arteries. The thoracic aorta is normal in course and caliber without dissection or aneurysm. There is a vascular access port over the right side of the chest.  The catheter enters via a jugular approach and extends to the SVC.  The heart is normal in size without pericardial effusion.  Mildly enlarged left axillary lymph nodes, new from prior.  Extensive abnormal adenopathy in the chest, markedly increased from prior.  A subcarinal lymph node which previously measured 3.1 x 2.3 cm currently measures 4.6 x 4.3 cm and there are multiple additional enlarged nodes, many of which are new from prior. There is no pleural effusion, pleural thickening, or pneumothorax.  A right infrahilar mass which previously measured 3.9 x 3.8 cm currently measures 4.8 x 4.6 cm.  The patient has innumerable pulmonary nodules which have increased markedly from prior.  Bibasilar pulmonary opacities, most suggestive of atelectasis. ABDOMEN:  LIVER: Markedly abnormal liver with multiple solid masses present, new and enlarged from prior.  Detailed evaluation is hindered by artifact on this scan but there are liver lesions measuring up to 12 cm present.  BILE DUCTS: No intrahepatic or extrahepatic biliary ductal dilatation.  GALLBLADDER: The gallbladder is is distended, but otherwise not grossly remarkable in appearance.  STOMACH: No abnormalities identified.  PANCREAS:  Heterogeneous decreased density in the region of the pancreatic head and near the junction of the head and body.  This is difficult to measure, but seems similar to the prior study.  This is nonspecific. I cannot exclude tumor.  No ductal dilatation.  SPLEEN: No splenomegaly or focal splenic lesion.  ADRENAL GLANDS: There is a right adrenal mass which has increased in size and now appears to be invading the adjacent liver.  KIDNEYS AND URETERS: Kidneys are normal in size and location.  Bilateral renal cysts, left greater than right.  Small nonobstructing calculus in the lower pole of the left kidney.  No right hydronephrosis but there appears to be new mild left hydronephrosis.  The left ureter can be traced down into an area of abnormal soft tissue, likely tumor, in the left side of the pelvis which is probably obstructing the ureter.  I do not see a definite ureteral calculus.  PELVIS:  BLADDER: There is a Mays catheter in the bladder and the bladder is decompressed.  The wall of the urinary bladder is diffusely thickened.  REPRODUCTIVE ORGANS: No abnormalities identified.  BOWEL: No abnormalities identified.  VESSELS: No abnormalities identified.  Abdominal aorta is normal in caliber.  PERITONEUM/RETROPERITONEUM/LYMPH NODES: No free fluid.  No pneumoperitoneum. Numerous mildly enlarged retroperitoneal lymph nodes have increased in size.  Numerous immediately enlarged iliac lymph nodes bilaterally, left greater than right.  These are somewhat confluent and difficult to measure.  Abnormal enlarged right inguinal lymph nodes have increased from prior.  ABDOMINAL WALL: There is abnormal soft tissue adjacent to the umbilicus which is likely tumor, previously measuring 2.9 x 1.8 cm and currently measuring 3.4 x 3.4 cm. SOFT TISSUES: There is a large irregular area of abnormal soft tissue density occupying much of the left side of the pelvis.  This appears to be tumor, significantly increased from prior.  This is  likely occluding the distal left ureter.  This encases the iliac vessels.  The iliac arteries are probably patent.  I do not have sufficient contrast to adequately evaluate the iliac veins but suspect the left iliac veins are either occluded or significantly compressed.  There does appear to be significant associated soft tissue swelling/edema in the visualized left upper leg.  The patient also has extensive tumor in the left inguinal region extending to the skin surface.  This has increased markedly from prior. There are probable mesenteric implants of tumor throughout the abdomen/pelvis, increased from prior.  BONES: Old-appearing bilateral rib fractures.  Degenerative changes in the spine.  Postoperative changes in the thoracolumbar spine with pedicle screws and posterior fusion hardware present.  Postoperative changes in the spine.  There are pedicle screws and posterior fusion rods spanning T11-L2.  Alignment is unremarkable.  Stable compression of the L1 vertebral body with mild posterior displacement of the vertebral body narrowing the canal.  There has been posterior decompressive surgery at this level.  No bony destructive lesions are seen with certainty.    Extensive metastatic disease in the chest, abdomen, and pelvis, markedly increased from prior imaging of 5/22/2023. No clear evidence of pulmonary embolism although patient factors hinder the sensitivity of this study.  Tumor in the hilar regions encases and distorts the pulmonary vessels and in the left hilum there may be some direct invasion of pulmonary veins or pulmonary arteries. Mildly increased markings in the lung bases, likely atelectasis.  I cannot exclude mild basilar infiltrates. Normal heart size.  Normal enhancement of the aorta. Degenerative and postoperative changes in the spine with posterior fusion hardware spanning T11-L2.  Stable compression fracture at L1. Old-appearing bilateral rib fractures. New left hydronephrosis, likely due to  tumor in the left side of the pelvis which has increased markedly.  There is a nonobstructing calculus in the lower pole of the left kidney but no ureteral calculi are apparent. There is a Mays catheter in the bladder and the bladder is decompressed.  Abnormal diffuse thickening of the wall of the urinary bladder. Tumor in the left side of the pelvis encases the iliac vessels.  I suspect the left iliac veins are occluded or significantly compressed and there appears to be associated edema in the visualized left leg. Signed by Garret Gomze MD    XR chest 1 view    Result Date: 12/2/2023  STUDY: Chest Radiograph;  12/01/2023 at 11:48 PM INDICATION: Tachycardia. COMPARISON: XR chest 01/18/21, 12/19/20. ACCESSION NUMBER(S): [id number] ORDERING CLINICIAN: NINI KILPATRICK TECHNIQUE:  Frontal chest was obtained at 2348 hours. FINDINGS: CARDIOMEDIASTINAL SILHOUETTE: Cardiomediastinal silhouette is stable in size and configuration. Right-sided chest port terminates in the distal SVC  LUNGS: Diffuse nodular densities are identified throughout the lungs suggesting metastatic disease.  More patchy airspace opacities are also seen in the right perihilar and left midlung region suspicious for superimposed infiltrates.  No large pleural effusion.  No pneumothorax.  ABDOMEN: No remarkable upper abdominal findings.  BONES: No acute osseous changes.    Diffuse nodular densities throughout the lungs compatible with metastatic disease.  In addition there are patchy airspace opacities bilaterally suggesting superimposed infiltrates. Signed by Italo Tejada MD       Assessment and Plan  #1 complicated UTI with left-sided hydronephrosis      Patient had been placed on meropenem but lost IV access so was sent to the emergency room for continuation of therapy.  IV access is obtained.  Patient will be started back on meropenem.  Cultures pending.  Urology consultation will be obtained for consideration of nephrostomy tube.  Patient  does have some mild acute kidney injury which may be contributed to from his hydronephrosis.      #2 metastatic melanoma      Patient with extensive metastatic disease and has exhibited in patient's CT of the chest abdomen and pelvis as well as known brain mets.  Patient is reestablishing with oncology at .  Will ask hospital oncology to see patient.      Family has not yet willing to accept palliative care or hospice care.    #3 encephalopathy      Patient currently is unresponsive but had just received narcotics.  Patient is known mets to the brain continues on Decadron.    #4 thrombocytopenia      DVT prophylaxis in form of SCDs only            Chao Goldberg MD

## 2023-12-02 NOTE — CONSULTS
Reason For Consult  Metastatic melanoma, new onset of left hydronephrosis, UTI    History Of Present Illness  Reilly Altamirano is a 68 y.o. male presenting with  past medical history of extensive metastatic melanoma including mets to the brain abdomen lungs presented to the emergency room due to decreased mental status and for loss of IV access for treatment of UTI with meropenem.  Diagnosis of left hydronephrosis on CT scan.  Patient not complain flank pain    Tmax 97.7, T-current 97.5, vital signs stable    Mays catheter 240 ml    White count 7.2, hemoglobin 8.9, creatinine 1.15    Urine culture pending, blood culture pending    CT abdomen and pelvis and a chest as below     Past Medical History  He has a past medical history of Abnormal electromyogram (EMG), Abnormal findings on diagnostic imaging of other abdominal regions, including retroperitoneum, Abnormal findings on diagnostic imaging of other specified body structures, Cardiomegaly (12/15/2020), Cellulitis of left lower limb (12/08/2021), Cellulitis of left lower limb (05/23/2022), Chronic pain syndrome (06/24/2021), Depression, unspecified (07/07/2021), Encounter for other preprocedural examination (07/07/2021), Encounter for preprocedural cardiovascular examination (07/07/2021), Generalized anxiety disorder (09/01/2020), Long term (current) use of opiate analgesic (07/07/2021), Neoplasm of unspecified behavior of digestive system (09/07/2021), Other conditions influencing health status (04/14/2021), Other mucopurulent conjunctivitis, unspecified eye (08/25/2020), Other neuromuscular dysfunction of bladder (12/30/2019), Other specified disorders of adrenal gland (CMS/HCC) (07/07/2021), Other specified soft tissue disorders (11/16/2020), Other specified symptoms and signs involving the digestive system and abdomen (07/16/2020), Personal history of (healed) traumatic fracture (07/10/2020), Personal history of diabetic foot ulcer (04/14/2021), Personal  "history of malignant melanoma of skin (05/15/2021), Personal history of other diseases of the circulatory system, Personal history of other diseases of the circulatory system (07/20/2021), Personal history of other endocrine, nutritional and metabolic disease (06/08/2021), Personal history of other medical treatment, Personal history of other medical treatment, Personal history of other specified conditions (08/06/2021), Personal history of other specified conditions (12/30/2019), Postprocedural seroma of a musculoskeletal structure following other procedure (02/13/2020), and Type 2 diabetes mellitus with foot ulcer (CODE) (CMS/[facility]) (07/07/2021).    Surgical History  He has a past surgical history that includes Knee surgery (03/02/2018); Other surgical history (04/06/2020); Other surgical history (08/21/2020); Other surgical history (08/21/2020); Other surgical history (04/14/2021); and US guided abscess drain (11/17/2022).     Social History  He reports that he has never smoked. He has never used smokeless tobacco. He reports that he does not currently use alcohol. He reports that he does not use drugs.    Family History  Family History   Problem Relation Name Age of Onset    Diabetes Other      Cancer Other          Allergies  Bee venom protein (honey bee)    Review of Systems       Physical Exam  Genitalia, circumcised, normal penis normal scrotum, foely catheter in place.  Concentrated yellow urine     Last Recorded Vitals  Blood pressure 122/81, pulse (!) 122, temperature 36.4 °C (97.5 °F), temperature source Temporal, resp. rate 19, height 1.93 m (6' 4\"), weight 121 kg (266 lb 15.6 oz), SpO2 92 %.    Relevant Results           Assessment/Plan     Impression  Metastatic melanoma  Left hydronephrosis-mild/asymptomatic  UTI    Plan  Conservative management for left hydronephrosis  Continue Mays catheter  Monitor urine output and renal function  No  intervention at this point  Call if problem    I spent 25 " minutes in the professional and overall care of this patient.      Chao Ramos MD

## 2023-12-02 NOTE — PROGRESS NOTES
Reilly Altamirano is a 68 y.o. male on day 0 of admission presenting with Urinary tract infection without hematuria, site unspecified.      Subjective      Reilly Altamirano is a 68 y.o. male with past medical history of extensive metastatic melanoma including mets to the brain abdomen lungs presented to the emergency room due to decreased mental status and for loss of IV access for treatment of UTI with meropenem.  Patient has just come to this area from treatment at [facility].  Patient is unable to provide me any information but he did just receive morphine in minutes before I saw him.  All information is from ER physician comments as well as old records available to us.  Apparently patient was recommended to pursue palliative care and/or hospice care from his treating physicians at [facility].  Patient has come searching for continued hope and treatment at another facility.  Patient had been seen actually at [facility] last week and started on Bactrim for a UTI and sent back to the nursing facility.  He was placed on meropenem and had PICC line put in place.  Patient became confused and accidentally pulled his central line and subsequently is being brought in for IV access and for further evaluation.  Patient had a telephone visit with Dr. Meier at [facility] to re-establish November 22.  Please refer to that note for details of patient's cancer diagnosis and treatment.  Upon arrival to the emergency room today patient's blood pressure was 96/78 pulse of 137.  Patient's was treated with IV fluid resuscitation with significant improvement of his blood pressure as well as his pulse.  Blood work shows glucose of 264 nonfasting state.  BMP significant for a bicarb of only 20 anion gap 16 with patient's lactic acid initially of 2.2 but after hydration 1.6.  BUN is 32 and creatinine of 1.15 with baseline creatinine approximately 1.0.  White count 7200 hemoglobin of  8.9 platelets of only 46,000.  Venous gases pH is 7.43 with pCO2 of 31.  Urinalysis is positive for protein glucose blood ketone leukocyte Estrace.  Patient has 11-20 RBCs 6-10 WBCs 2+ bacteria.  Imaging shows extensive adenopathy in the thoracic cavity.  Patient has enlarging nodes 4.6 x 4.3 cm in size right infrahilar mass measuring now 4.8 x 4.6 cm in size innumerable pulmonary nodules severely increased from previous studies.  Liver has marked abnormal liver with multiple solid masses present new and enlarged from previous studies.  Pancreas has a heterogeneous decreased density in the region of the pancreatic head near the junction of the head and the body.  Nonspecific but cannot exclude tumor.  Right adrenal mass increasing in size appears to be invading adjacent liver.  Patient has evidence of new mild left hydronephrosis with a left ureter traced down into an area of abnormal soft tissue likely a tumor left side of the pelvis which is probably obstructing the ureter.  Patient with large irregular area of abnormal soft tissue density occupying much of the left side of the pelvis most likely appears to be tumor significant increase from previous studies likely causing obstruction of the left distal ureter and encases the iliac vessels..  Patient is being admitted to the hospital for what appears to be potential of early sepsis from UTI with patient having evidence of obstructed left ureter with mild hydronephrosis.  Patient with quite extensive metastatic melanoma including mets to the brain liver lungs and pelvis.  Patient will be established with oncology for further recommendations and stabilization prior to following up with [facility] oncology.  Patient will be continued on meropenem for his UTI with supportive IV fluids supportive care otherwise.  Patient's active CODE STATUS is full code as family is not ready to accept palliative care at this present time.    Patient was admitted with a diagnosis of  complicated UTI with left-sided hydronephrosis-restarted started on IV meropenem.  Urology consulted    12/02: Patient was evaluated this a.m., pleasantly confused, does not seem to be any distress.  No fever or chills, no nausea or vomiting.    Objective   Patient is awake, pleasantly confused, not in apparent distress  Eyes: PERRLA, no conjunctival congestion  Chest: Bilateral Air entry, no crackles or wheezing  Heart: s1S2 regular, no murmur  Abdomen: Soft, non tender, BS present  Ext: Mild edema lower extremity, wound/lesion on left heel  Last Recorded Vitals  /76 (BP Location: Left arm, Patient Position: Lying)   Pulse 64   Temp 36.2 °C (97.1 °F) (Temporal)   Resp 19   Wt 121 kg (266 lb 15.6 oz)   SpO2 95%   Intake/Output last 3 Shifts:    Intake/Output Summary (Last 24 hours) at 12/2/2023 1250  Last data filed at 12/2/2023 1243  Gross per 24 hour   Intake 221.25 ml   Output 240 ml   Net -18.75 ml       Admission Weight  Weight: 121 kg (266 lb 15.6 oz) (12/01/23 2125)    Daily Weight  12/01/23 : 121 kg (266 lb 15.6 oz)    Image Results  CT angio chest for pulmonary embolism  Narrative: STUDY:  CT Angiogram of the Chest, CT Abdomen and Pelvis with IV Contrast;  12/2/2023 at 1:07 a.m.  INDICATION:  Abdominal pain medial to ostomy site.  Additional History:  Melanoma.  COMPARISON:  CT CAP 5/22/2023.  CTA chest 1/18/2021.  ACCESSION NUMBER(S):  [id number], [id number]  ORDERING CLINICIAN:  NINI KILPATRICK  TECHNIQUE:  CTA of the chest was performed following rapid injection  of intravenous contrast.  Images are reviewed and processed at a  workstation according to the CT angiogram protocol with 3-D and/or MIP  post processing imaging generated.  CT of the abdomen and pelvis was  performed with intravenous contrast.  Omnipaque 350 100 mL was  administered intravenously; positive oral contrast was given.  Automated mA/kV exposure control was utilized and patient examination  was performed in strict  accordance with principles of ALARA.  FINDINGS:    CTA CHEST:  Adenopathy in the hilar regions distorts the pulmonary vessels.   Pulmonary arterial enhancement is inhomogeneous.  I do not see clear  evidence of pulmonary emboli.  In the region of the left hilum there  may be some tumor invading the pulmonary veins and possibly the  pulmonary arteries.  The thoracic aorta is normal in course and caliber without dissection  or aneurysm.  There is a vascular access port over the right side of the chest.  The  catheter enters via a jugular approach and extends to the SVC.    The heart is normal in size without pericardial effusion.  Mildly  enlarged left axillary lymph nodes, new from prior.  Extensive  abnormal adenopathy in the chest, markedly increased from prior.  A  subcarinal lymph node which previously measured 3.1 x 2.3 cm currently  measures 4.6 x 4.3 cm and there are multiple additional enlarged  nodes, many of which are new from prior.  There is no pleural effusion, pleural thickening, or pneumothorax.    A right infrahilar mass which previously measured 3.9 x 3.8 cm  currently measures 4.8 x 4.6 cm.  The patient has innumerable  pulmonary nodules which have increased markedly from prior.  Bibasilar  pulmonary opacities, most suggestive of atelectasis.  ABDOMEN:     LIVER:  Markedly abnormal liver with multiple solid masses present, new and  enlarged from prior.  Detailed evaluation is hindered by artifact on  this scan but there are liver lesions measuring up to 12 cm present.     BILE DUCTS:  No intrahepatic or extrahepatic biliary ductal dilatation.     GALLBLADDER:  The gallbladder is is distended, but otherwise not grossly remarkable  in appearance.     STOMACH:  No abnormalities identified.     PANCREAS:  Heterogeneous decreased density in the region of the pancreatic head  and near the junction of the head and body.  This is difficult to  measure, but seems similar to the prior study.  This is  nonspecific.   I cannot exclude tumor.  No ductal dilatation.     SPLEEN:  No splenomegaly or focal splenic lesion.     ADRENAL GLANDS:  There is a right adrenal mass which has increased in size and now  appears to be invading the adjacent liver.     KIDNEYS AND URETERS:  Kidneys are normal in size and location.  Bilateral renal cysts, left  greater than right.  Small nonobstructing calculus in the lower pole  of the left kidney.  No right hydronephrosis but there appears to be  new mild left hydronephrosis.  The left ureter can be traced down into  an area of abnormal soft tissue, likely tumor, in the left side of the  pelvis which is probably obstructing the ureter.  I do not see a  definite ureteral calculus.     PELVIS:     BLADDER:  There is a Mays catheter in the bladder and the bladder is  decompressed.  The wall of the urinary bladder is diffusely thickened.     REPRODUCTIVE ORGANS:  No abnormalities identified.     BOWEL:  No abnormalities identified.     VESSELS:  No abnormalities identified.  Abdominal aorta is normal in caliber.      PERITONEUM/RETROPERITONEUM/LYMPH NODES:  No free fluid.  No pneumoperitoneum.  Numerous mildly enlarged retroperitoneal lymph nodes have increased in  size.  Numerous immediately enlarged iliac lymph nodes bilaterally,  left greater than right.  These are somewhat confluent and difficult  to measure.  Abnormal enlarged right inguinal lymph nodes have  increased from prior.     ABDOMINAL WALL:  There is abnormal soft tissue adjacent to the umbilicus which is  likely tumor, previously measuring 2.9 x 1.8 cm and currently  measuring 3.4 x 3.4 cm.  SOFT TISSUES:   There is a large irregular area of abnormal soft tissue density  occupying much of the left side of the pelvis.  This appears to be  tumor, significantly increased from prior.  This is likely occluding  the distal left ureter.  This encases the iliac vessels.  The iliac  arteries are probably patent.  I do not have  sufficient contrast to  adequately evaluate the iliac veins but suspect the left iliac veins  are either occluded or significantly compressed.  There does appear to  be significant associated soft tissue swelling/edema in the visualized  left upper leg.  The patient also has extensive tumor in the left  inguinal region extending to the skin surface.  This has increased  markedly from prior.  There are probable mesenteric implants of tumor throughout the  abdomen/pelvis, increased from prior.     BONES:  Old-appearing bilateral rib fractures.  Degenerative changes in the  spine.  Postoperative changes in the thoracolumbar spine with pedicle  screws and posterior fusion hardware present.  Postoperative changes  in the spine.  There are pedicle screws and posterior fusion rods  spanning T11-L2.  Alignment is unremarkable.  Stable compression of  the L1 vertebral body with mild posterior displacement of the  vertebral body narrowing the canal.  There has been posterior  decompressive surgery at this level.  No bony destructive lesions are  seen with certainty.  Impression: Extensive metastatic disease in the chest, abdomen, and pelvis,  markedly increased from prior imaging of 5/22/2023.  No clear evidence of pulmonary embolism although patient factors  hinder the sensitivity of this study.  Tumor in the hilar regions  encases and distorts the pulmonary vessels and in the left hilum there  may be some direct invasion of pulmonary veins or pulmonary arteries.  Mildly increased markings in the lung bases, likely atelectasis.  I  cannot exclude mild basilar infiltrates.  Normal heart size.  Normal enhancement of the aorta.  Degenerative and postoperative changes in the spine with posterior  fusion hardware spanning T11-L2.  Stable compression fracture at L1.   Old-appearing bilateral rib fractures.  New left hydronephrosis, likely due to tumor in the left side of the  pelvis which has increased markedly.  There is a  nonobstructing  calculus in the lower pole of the left kidney but no ureteral calculi  are apparent.  There is a Mays catheter in the bladder and the bladder is  decompressed.  Abnormal diffuse thickening of the wall of the urinary  bladder.  Tumor in the left side of the pelvis encases the iliac vessels.  I  suspect the left iliac veins are occluded or significantly compressed  and there appears to be associated edema in the visualized left leg.  Signed by Garret Gomez MD  CT abdomen pelvis w IV contrast  Narrative: STUDY:  CT Angiogram of the Chest, CT Abdomen and Pelvis with IV Contrast;  12/2/2023 at 1:07 a.m.  INDICATION:  Abdominal pain medial to ostomy site.  Additional History:  Melanoma.  COMPARISON:  CT CAP 5/22/2023.  CTA chest 1/18/2021.  ACCESSION NUMBER(S):  [id number], [id number]  ORDERING CLINICIAN:  NINI KILPATRICK  TECHNIQUE:  CTA of the chest was performed following rapid injection  of intravenous contrast.  Images are reviewed and processed at a  workstation according to the CT angiogram protocol with 3-D and/or MIP  post processing imaging generated.  CT of the abdomen and pelvis was  performed with intravenous contrast.  Omnipaque 350 100 mL was  administered intravenously; positive oral contrast was given.  Automated mA/kV exposure control was utilized and patient examination  was performed in strict accordance with principles of ALARA.  FINDINGS:    CTA CHEST:  Adenopathy in the hilar regions distorts the pulmonary vessels.   Pulmonary arterial enhancement is inhomogeneous.  I do not see clear  evidence of pulmonary emboli.  In the region of the left hilum there  may be some tumor invading the pulmonary veins and possibly the  pulmonary arteries.  The thoracic aorta is normal in course and caliber without dissection  or aneurysm.  There is a vascular access port over the right side of the chest.  The  catheter enters via a jugular approach and extends to the SVC.    The heart is  normal in size without pericardial effusion.  Mildly  enlarged left axillary lymph nodes, new from prior.  Extensive  abnormal adenopathy in the chest, markedly increased from prior.  A  subcarinal lymph node which previously measured 3.1 x 2.3 cm currently  measures 4.6 x 4.3 cm and there are multiple additional enlarged  nodes, many of which are new from prior.  There is no pleural effusion, pleural thickening, or pneumothorax.    A right infrahilar mass which previously measured 3.9 x 3.8 cm  currently measures 4.8 x 4.6 cm.  The patient has innumerable  pulmonary nodules which have increased markedly from prior.  Bibasilar  pulmonary opacities, most suggestive of atelectasis.  ABDOMEN:     LIVER:  Markedly abnormal liver with multiple solid masses present, new and  enlarged from prior.  Detailed evaluation is hindered by artifact on  this scan but there are liver lesions measuring up to 12 cm present.     BILE DUCTS:  No intrahepatic or extrahepatic biliary ductal dilatation.     GALLBLADDER:  The gallbladder is is distended, but otherwise not grossly remarkable  in appearance.     STOMACH:  No abnormalities identified.     PANCREAS:  Heterogeneous decreased density in the region of the pancreatic head  and near the junction of the head and body.  This is difficult to  measure, but seems similar to the prior study.  This is nonspecific.   I cannot exclude tumor.  No ductal dilatation.     SPLEEN:  No splenomegaly or focal splenic lesion.     ADRENAL GLANDS:  There is a right adrenal mass which has increased in size and now  appears to be invading the adjacent liver.     KIDNEYS AND URETERS:  Kidneys are normal in size and location.  Bilateral renal cysts, left  greater than right.  Small nonobstructing calculus in the lower pole  of the left kidney.  No right hydronephrosis but there appears to be  new mild left hydronephrosis.  The left ureter can be traced down into  an area of abnormal soft tissue, likely  tumor, in the left side of the  pelvis which is probably obstructing the ureter.  I do not see a  definite ureteral calculus.     PELVIS:     BLADDER:  There is a Mays catheter in the bladder and the bladder is  decompressed.  The wall of the urinary bladder is diffusely thickened.     REPRODUCTIVE ORGANS:  No abnormalities identified.     BOWEL:  No abnormalities identified.     VESSELS:  No abnormalities identified.  Abdominal aorta is normal in caliber.      PERITONEUM/RETROPERITONEUM/LYMPH NODES:  No free fluid.  No pneumoperitoneum.  Numerous mildly enlarged retroperitoneal lymph nodes have increased in  size.  Numerous immediately enlarged iliac lymph nodes bilaterally,  left greater than right.  These are somewhat confluent and difficult  to measure.  Abnormal enlarged right inguinal lymph nodes have  increased from prior.     ABDOMINAL WALL:  There is abnormal soft tissue adjacent to the umbilicus which is  likely tumor, previously measuring 2.9 x 1.8 cm and currently  measuring 3.4 x 3.4 cm.  SOFT TISSUES:   There is a large irregular area of abnormal soft tissue density  occupying much of the left side of the pelvis.  This appears to be  tumor, significantly increased from prior.  This is likely occluding  the distal left ureter.  This encases the iliac vessels.  The iliac  arteries are probably patent.  I do not have sufficient contrast to  adequately evaluate the iliac veins but suspect the left iliac veins  are either occluded or significantly compressed.  There does appear to  be significant associated soft tissue swelling/edema in the visualized  left upper leg.  The patient also has extensive tumor in the left  inguinal region extending to the skin surface.  This has increased  markedly from prior.  There are probable mesenteric implants of tumor throughout the  abdomen/pelvis, increased from prior.     BONES:  Old-appearing bilateral rib fractures.  Degenerative changes in the  spine.   Postoperative changes in the thoracolumbar spine with pedicle  screws and posterior fusion hardware present.  Postoperative changes  in the spine.  There are pedicle screws and posterior fusion rods  spanning T11-L2.  Alignment is unremarkable.  Stable compression of  the L1 vertebral body with mild posterior displacement of the  vertebral body narrowing the canal.  There has been posterior  decompressive surgery at this level.  No bony destructive lesions are  seen with certainty.  Impression: Extensive metastatic disease in the chest, abdomen, and pelvis,  markedly increased from prior imaging of 5/22/2023.  No clear evidence of pulmonary embolism although patient factors  hinder the sensitivity of this study.  Tumor in the hilar regions  encases and distorts the pulmonary vessels and in the left hilum there  may be some direct invasion of pulmonary veins or pulmonary arteries.  Mildly increased markings in the lung bases, likely atelectasis.  I  cannot exclude mild basilar infiltrates.  Normal heart size.  Normal enhancement of the aorta.  Degenerative and postoperative changes in the spine with posterior  fusion hardware spanning T11-L2.  Stable compression fracture at L1.   Old-appearing bilateral rib fractures.  New left hydronephrosis, likely due to tumor in the left side of the  pelvis which has increased markedly.  There is a nonobstructing  calculus in the lower pole of the left kidney but no ureteral calculi  are apparent.  There is a Mays catheter in the bladder and the bladder is  decompressed.  Abnormal diffuse thickening of the wall of the urinary  bladder.  Tumor in the left side of the pelvis encases the iliac vessels.  I  suspect the left iliac veins are occluded or significantly compressed  and there appears to be associated edema in the visualized left leg.  Signed by Garret Gomez MD  XR chest 1 view  Narrative: STUDY:  Chest Radiograph;  12/01/2023 at 11:48  PM  INDICATION:  Tachycardia.  COMPARISON:  XR chest 01/18/21, 12/19/20.  ACCESSION NUMBER(S):  [id number]  ORDERING CLINICIAN:  NINI KILPATRICK  TECHNIQUE:  Frontal chest was obtained at 2348 hours.  FINDINGS:  CARDIOMEDIASTINAL SILHOUETTE:  Cardiomediastinal silhouette is stable in size and configuration.   Right-sided chest port terminates in the distal SVC     LUNGS:  Diffuse nodular densities are identified throughout the lungs  suggesting metastatic disease.  More patchy airspace opacities are  also seen in the right perihilar and left midlung region suspicious  for superimposed infiltrates.  No large pleural effusion.  No  pneumothorax.     ABDOMEN:  No remarkable upper abdominal findings.     BONES:  No acute osseous changes.  Impression: Diffuse nodular densities throughout the lungs compatible with  metastatic disease.  In addition there are patchy airspace opacities  bilaterally suggesting superimposed infiltrates.  Signed by Italo Tejada MD      Physical Exam    Relevant Results               Assessment/Plan   This patient currently has cardiac telemetry ordered; if you would like to modify or discontinue the telemetry order, click here to go to the orders activity to modify/discontinue the order.              Principal Problem:    Urinary tract infection without hematuria, site unspecified    #1 complicated UTI with left-sided hydronephrosis  Continue on meropenem  Follow cultures  Urology follow-up cultures pending.         #2 metastatic melanoma      Patient with extensive metastatic disease and has exhibited in patient's CT of the chest abdomen and pelvis as well as known brain mets.  Patient is reestablishing with oncology at .  Will ask hospital oncology to see patient.      Family has not yet willing to accept palliative care or hospice care.     #3 encephalopathy      Patient currently is unresponsive but had just received narcotics.  Patient is known mets to the brain continues on  Decadron.     #4 thrombocytopenia      DVT prophylaxis in form of SCDs only    #5 type 2 diabetes  Continue on basal and bolus insulin with sliding scale coverage  A1c-7.9    #6.  Vitamin D deficiency  Oral supplementation                   Danielle Bauman MD

## 2023-12-02 NOTE — PROGRESS NOTES
Pharmacy Medication History Review    Reilly Altamirano is a 68 y.o. male admitted for No Principal Problem: There is no principal problem currently on the Problem List.. Pharmacy reviewed the patient's pepob-vq-yrjzlhikr medications and allergies for accuracy.      PTA Medication List has been updated as appears on Order Summary Report from [facility] (12/1/23, 20:12:12 ET).        The list below reflects the updated PTA list.     Prior to Admission Medications   Prescriptions   Facility Reported?    DULoxetine (Cymbalta) 60 mg DR capsule   Yes    Sig: Take 1 capsule (60 mg) by mouth once daily. (In morning)   acyclovir (Zovirax) 800 mg tablet   Yes    Sig: Take 1 tablet (800 mg) by mouth 2 times a day. Morning and Bedtime      buprenorphine (Butrans) 20 mcg/hour patch   Yes    Sig: Place 1 patch on the skin 1 (one) time per week.   In the morning, every 7 days, for pain.  (Start Date 11/29/23 per SNF List)   calcium citrate-vitamin D3 315 mg-6.25 mcg (250 unit) tablet   Yes    Sig: Take 1 tablet (315 mg) by mouth once daily in the morning.   camphor-menthoL (Sarna) lotion   Yes    Sig: Apply 1 Application topically every 4 hours if needed (for rash).   carvedilol (Coreg) 6.25 mg tablet   Yes    Sig: Take 1 tablet (6.25 mg) by mouth 2 times a day. Morning and Bedtime   cyanocobalamin (Vitamin B-12) 1,000 mcg tablet   Yes    Sig: Take 1 tablet (1,000 mcg) by mouth once daily in the morning.   dapsone 100 mg tablet   Yes    Sig: Take 1 tablet (100 mg) by mouth once daily in the morning.   dexAMETHasone (Decadron) 2 mg tablet   Yes    Sig: Take 1 tablet (2 mg) by mouth 2 times a day.   For inflammation, until 12/12/23.   diphenhydrAMINE (Sominex) 25 mg tablet   Yes    Sig: Take 1 tablet (25 mg) by mouth every 6 hours if needed for itching.   fluticasone (Flonase) 50 mcg/actuation nasal spray   Yes    Sig: Administer 1 spray into each nostril once daily in the morning.   folic acid (Folvite) 1 mg  tablet   Yes    Sig: Take 1 tablet (1 mg) by mouth once daily in the morning.   haloperidol (Haldol) 2 mg tablet   Yes    Sig: Take 1 tablet (2 mg) by mouth every 8 hours if needed (for increased anxiety).   For 14 days, End Date 12/12/23.   hydrOXYzine HCL (Atarax) 10 mg tablet   Yes    Sig: Take 1 tablet (10 mg) by mouth every 8 hours if needed for itching.   For 14 days, ending 12/12/23.   ibuprofen 600 mg tablet   Yes    Sig: Take 1 tablet (600 mg) by mouth every 8 hours if needed   for mild pain (1 - 3) or moderate pain (4 - 6).   insulin glargine (Lantus) 100 unit/mL injection   Yes    Sig: Inject 22 Units under the skin once daily at bedtime.   insulin regular (HumuLIN R) 100 unit/mL injection   Yes    Sig: Inject under the skin. Before Meals, Per Sliding Scale:   151-200=2; 201-250=4; 251-300=6; 301-350=8; 351-399=10; 400+=12 and notify NP.      letermovir (Prevymis) 480 mg tablet   Yes    Sig: Take 1 tablet (480 mg) by mouth once daily in the morning.   For malignant neoplasm of brain   levETIRAcetam (Keppra) 500 mg tablet   Yes    Sig: Take 1 tablet (500 mg) by mouth 2 times a day.   lidocaine (Lidoderm) 5 % patch   Yes    Sig: Place 1 patch on the skin once daily.   Apply in morning, remove patch in evening, to affected area(s).      melatonin 3 mg tablet   Yes    Sig: Take 2 tablets (6 mg) by mouth once daily at bedtime.   meropenem (Merrem) 1 gram injection   Yes    Sig: Infuse 20 mL (1 g) into a venous catheter every 8 hours.   For 7 Days, Ending 12/7/23.   sodium bicarbonate 650 mg tablet   Yes    Sig: Take 1 tablet (650 mg) by mouth 3 times a day.   sulfamethoxazole-trimethoprim (Bactrim DS) 800-160 mg tablet   Yes    Sig: Take 1 tablet by mouth 2 times a day.   For UTI for 10 days, ending 12/8/23.   tamsulosin (Flomax) 0.4 mg 24 hr capsule   Yes    Sig: Take 1 capsule (0.4 mg) by mouth 2 times a day.   triamcinolone (Kenalog) 0.1 % cream   Yes    Sig: Apply 1 Application topically every 4 hours if  needed.   To arms and back for rash   zinc sulfate (Zincate) 220 (50 Zn) MG capsule   Yes    Sig: Take 1 capsule (50 mg of elemental zinc) by mouth once daily   in the morning. Before breakfast      Facility-Administered Medications: None        The list below reflects the updated allergy list. Please review each documented allergy for additional clarification and justification.  Allergies  Reviewed by Charlotte Cerrato RN on 12/1/2023        Severity Reactions Comments    Bee Venom Protein (honey Bee) High Shortness of breath, Swelling           ------------------------------------  Freddy Milligan, PharmD, [facility]  Transitions of Care Pharmacist  Virtual Overnight Coverage from [facility]  Medication reconciliation complete  Please reach out via [employer] Secure Chat for questions,   or if no response call [phone]  [facility]

## 2023-12-02 NOTE — ED TRIAGE NOTES
Pt comes from \[facility]\"" home, reported pt pulled out what may have been midline from RUE, pt was covered in blood estimated 100cc when found, pt has baseline confusion due to cancer with mets.

## 2023-12-02 NOTE — PROGRESS NOTES
Speech-Language Pathology    Inpatient Speech-Language Pathology Clinical Swallow Evaluation    Patient Name: Reilly Altamirano  MRN: [id number]  Today's Date: 12/2/2023             Current Problem:   1. Urinary tract infection without hematuria, site unspecified        2. Metastatic melanoma (CMS/HCC)        3. Sepsis without acute organ dysfunction, due to unspecified organism (CMS/HCC)              Recommendations:   Diet modification to IDDSI 4/Pureed with IDDSI 2/Mildly Thick/[place]  ST to follow to ensure safety. Diet modifications as needed.        Assessment:   Clinical Swallow Assessment      Plan:  SLP Plan: Skilled SLP  SLP Frequency: 2x per week  Duration: 2 weeks  SLP Discharge Recommendations: Skilled nursing facility placement  Solid Consistency: Pureed/extremely thick (IDDSI Level 4)  Liquid Consistency: Nectar thick/mildly thick (IDDS Level 2)        Subjective   Current Problem:  The patient is a 68-year-old male who was brought in by EMS from his nursing home facility with altered mental status and accidentally pulled out his peripheral IV there was a lot of bleeding and he was found with blood all over.  He is a new resident of [facility] and was discharged from [facility] on Sunday has been at [facility] for 2 days and has a chronic indwelling Mays catheter which he recently started on IV antibiotics for which she has been getting through the peripheral IV although he does have a right chest wall Mediport.  Family states that since he was diagnosed with a urinary tract infection he has been acting more confused he does have a history of melanoma with metastasis to the lungs and the brain when trial till experimental treatment in at [facility] but it did not help and he since has been at the nursing facility and currently not receiving any treatment for his cancer   PMH / PSH: as per HPI, reviewed in EMR and discussed with the patient metastatic melanoma, congestive heart failure, cognitive  "dysfunction dysphagia, type 2 diabetes, essential hypertension, unspecified cardiac arrhythmia      General Visit Information:         Vital Signs:         Objective       Baseline Assessment:   Per phone conversation with nursing at [facility], pt was there for 3 days and was on a Regular Diet with Honey Thick Liquids, Meds crushed in Honey Thick Liquids      Pain:    Pt is confused and calling out for someone.  He reports pain but can not tell me anything further. Pt is reaching for items of his tray table. He calms and falls asleep when HOB is lowered and the TV is playing music.      Oral/Motor Assessment:   Pt has dentition, but he is not follow ing directions to obtain Oral Mech      Consistencies Trialed:   Thin, Pureed.  Pt is given 4 oz juice and 2 oz water then states \"I'm done\"  He also accepts 4 bites of apple sauce then states \"Okay. I'm done.\"  He declines diced peaches and tiffanie cracker.  Oral phase is min impaired with mild oral holding on occasion. He will sometime take 2 swallow to clear a bolus. Cues to swallow are followed for the most part,  Swallow initiation is timely with no post swallow distress.        Clinical Observations:   Fatigue and confusion impact prognosis.  Nursing reporting pt will take a portion of his meds and then states, \"I'm done.\"      SLP Outcome Measures:                Outpatient Education:         Inpatient:  Education Documentation  No documentation found.  Education Comments  No comments found.          Consultations/Referrals/Coordination of Services: N/A        "

## 2023-12-02 NOTE — ED PROVIDER NOTES
HPI   No chief complaint on file.      HPI:  The patient is a 68-year-old male who was brought in by EMS from his nursing home facility with altered mental status and accidentally pulled out his peripheral IV there was a lot of bleeding and he was found with blood all over.  He is a new resident of [facility] and was discharged from [facility] on Sunday has been at [facility] for 2 days and has a chronic indwelling Mays catheter which he recently started on IV antibiotics for which she has been getting through the peripheral IV although he does have a right chest wall Mediport.  Family states that since he was diagnosed with a urinary tract infection he has been acting more confused he does have a history of melanoma with metastasis to the lungs and the brain when trial till experimental treatment in at [facility] but it did not help and he since has been at the nursing facility and currently not receiving any treatment for his cancer    Limitations to history: Patient is altered mental status  Independent Historians: Family at bedside EMS report and nursing home report  External Records Reviewed: EMR records, nursing home paper chart  ------------------------------------------------------------------------------------------------------------------------------------------  ROS: a ten point review of systems was performed and negative except as per HPI.  ------------------------------------------------------------------------------------------------------------------------------------------  PMH / PSH: as per HPI, reviewed in EMR and discussed with the patient metastatic melanoma, congestive heart failure, cognitive dysfunction dysphagia, type 2 diabetes, essential hypertension, unspecified cardiac arrhythmia  MEDS:  reviewed in EMR and discussed with the patient reviewed with nursing home med rec significant for oral acyclovir, carvedilol, dapsone Lantus, Keppra, recently started on meropenem IV, sodium  bicarb tablets, dexamethasone Humalog  ALLERGIES: reviewed in EMR[]  SocH:  as per HPI, otherwise reviewed in EMR []  FH:  as per HPI, otherwise reviewed in EMR []  ------------------------------------------------------------------------------------------------------------------------------------------  Physical Exam:  VS: As documented in the triage note and EMR flowsheet from this visit was reviewed  General: Well appearing. No acute distress.   Eyes:  Extraocular movements grossly intact. No scleral icterus.   HEENT: Atraumatic. Normocephalic.    Neck: Supple. No gross masses  CV: RRR, audible S1/S2, 2+ symmetric peripheral pulses  Resp: Clear to auscultation bilaterally. No respiratory distress.  Non-labored respirations  GI: Soft, non-tender, non-distended, no rebound or gaurding  MSK: Symmetric muscle bulk. No gross step offs or deformities.  Skin: Warm, dry, no obvious rash.  Neuro: Speech fluent. Awake. Alert. Appropriate conversation.  Psych: Appropriate mood and affect for situation  ------------------------------------------------------------------------------------------------------------------------------------------  Hospital Course / Medical Decision Making:  Independent Interpretations:  EKG -   Twelve-lead EKG performed and independently interpreted by me as showing sinus tachycardia at a ventricular rate of 138 borderline low voltage in the extremity leads ST segments are flat not elevated no reciprocal depression QRS duration of 104 prolonged QT interval no signs of acute ischemia    68-year-old male with metastatic melanoma with mets to the lung and brain sent in from the nursing home with altered mental status tachycardia hypotension recently switched to IV meropenem via a peripheral IV line for urinary tract infection and accidentally pulled his peripheral IV out and was found in a pool of blood today.    Upon arrival the patient was tachycardic hypotensive and confused no obvious bleeding from  his peripheral IV dislodgment.  He was hooked up to the cardiac monitor peripheral IV access was obtained family was at the bedside was able to provide additional history state he is more confused since being diagnosed with a urinary tract infection on top of his already cognitive impairment due to the meds.  He had pan blood cultures obtained sepsis workup was started Banken Zosyn was given for broad-spectrum antibiotic coverage we did 6 change on his Mays catheter and submitted a urinary urinalysis with culture it is in fact positive for UTI chest x-ray also appears to show his metastatic disease but also bilateral infiltrative process consistent with pneumonia.  Patient underwent CT imaging of the chest abdomen and pelvis which we will follow-up on the results because he is bedbound and his left lower extremity is more swollen than the right but family states that this is chronic and actually better appearing than usual.  He was left with the hospitalist who agreed with the plan for admission for IV fluids IV antibiotics broad-spectrum and further stabilization.    Patient care discussed with: [Admitting team,   Social Determinants affecting care:     Final diagnosis and disposition: Sepsis secondary to urinary tract infection and pneumonia            Cat Avelar MD                                      [place] Coma Scale Score: 14                  Patient History   Past Medical History:   Diagnosis Date    Abnormal electromyogram (EMG)     Abnormal electromyogram (EMG)    Abnormal findings on diagnostic imaging of other abdominal regions, including retroperitoneum     Abnormal CT of the abdomen    Abnormal findings on diagnostic imaging of other specified body structures     Abnormal CT of the chest    Cardiomegaly 12/15/2020    Left ventricular hypertrophy    Cellulitis of left lower limb 12/08/2021    Cellulitis of left lower extremity    Cellulitis of left lower limb 05/23/2022    Cellulitis of left  lower extremity    Chronic pain syndrome 06/24/2021    Chronic pain syndrome    Depression, unspecified 07/07/2021    Depressive disorder    Encounter for other preprocedural examination 07/07/2021    Preoperative clearance    Encounter for preprocedural cardiovascular examination 07/07/2021    Preoperative cardiovascular examination    Generalized anxiety disorder 09/01/2020    Generalized anxiety disorder    Long term (current) use of opiate analgesic 07/07/2021    Long term prescription opiate use    Neoplasm of unspecified behavior of digestive system 09/07/2021    Pancreatic neoplasm    Other conditions influencing health status 04/14/2021    Uncontrolled diabetes mellitus with diabetic neuropathy    Other mucopurulent conjunctivitis, unspecified eye 08/25/2020    Pink eye    Other neuromuscular dysfunction of bladder 12/30/2019    Frequency-urgency syndrome    Other specified disorders of adrenal gland (CMS/HCC) 07/07/2021    Adrenal mass    Other specified soft tissue disorders 11/16/2020    Redness and swelling of lower leg    Other specified symptoms and signs involving the digestive system and abdomen 07/16/2020    Abdominal weakness    Personal history of (healed) traumatic fracture 07/10/2020    History of fracture of rib    Personal history of diabetic foot ulcer 04/14/2021    History of diabetic ulcer of foot    Personal history of malignant melanoma of skin 05/15/2021    History of malignant melanoma of skin    Personal history of other diseases of the circulatory system     History of abnormal electrocardiography    Personal history of other diseases of the circulatory system 07/20/2021    History of congestive heart failure    Personal history of other endocrine, nutritional and metabolic disease 06/08/2021    History of type 2 diabetes mellitus    Personal history of other medical treatment     H/O Doppler ultrasound    Personal history of other medical treatment     History of echocardiogram     Personal history of other specified conditions 08/06/2021    History of urinary retention    Personal history of other specified conditions 12/30/2019    History of nocturia    Postprocedural seroma of a musculoskeletal structure following other procedure 02/13/2020    Postoperative seroma of musculoskeletal structure after non-musculoskeletal procedure    Type 2 diabetes mellitus with foot ulcer (CODE) (CMS/[facility]) 07/07/2021    Type 2 diabetes mellitus with foot ulcer, with long-term current use of insulin     Past Surgical History:   Procedure Laterality Date    KNEE SURGERY  03/02/2018    Knee Surgery    OTHER SURGICAL HISTORY  04/06/2020    Skin debridement    OTHER SURGICAL HISTORY  08/21/2020    Foot surgery    OTHER SURGICAL HISTORY  08/21/2020    Punch biopsy    OTHER SURGICAL HISTORY  04/14/2021    Back surgery    US GUIDED ABSCESS DRAIN  11/17/2022    US GUIDED ABSCESS DRAIN 11/17/2022 [facility]     Family History   Problem Relation Name Age of Onset    Diabetes Other      Cancer Other       Social History     Tobacco Use    Smoking status: Never    Smokeless tobacco: Never   Vaping Use    Vaping Use: Never used   Substance Use Topics    Alcohol use: Not Currently    Drug use: Never       Physical Exam   ED Triage Vitals [12/01/23 2125]   Temp Heart Rate Resp BP   36.5 °C (97.7 °F) (!) 137 19 96/78      SpO2 Temp Source Heart Rate Source Patient Position   96 % Skin Monitor Lying      BP Location FiO2 (%)     Left arm --       Physical Exam    ED Course & MDM        Medical Decision Making      Procedure  Procedures     Cat Avelar MD  12/02/23 0153

## 2023-12-03 NOTE — PROGRESS NOTES
Reilly Altamirano is a 68 y.o. male on day 1 of admission presenting with Urinary tract infection without hematuria, site unspecified.      Subjective      Reilly Altamirano is a 68 y.o. male with past medical history of extensive metastatic melanoma including mets to the brain abdomen lungs presented to the emergency room due to decreased mental status and for loss of IV access for treatment of UTI with meropenem.  Patient has just come to this area from treatment at [facility].  Patient is unable to provide me any information but he did just receive morphine in minutes before I saw him.  All information is from ER physician comments as well as old records available to us.  Apparently patient was recommended to pursue palliative care and/or hospice care from his treating physicians at [facility].  Patient has come searching for continued hope and treatment at another facility.  Patient had been seen actually at [facility] last week and started on Bactrim for a UTI and sent back to the nursing facility.  He was placed on meropenem and had PICC line put in place.  Patient became confused and accidentally pulled his central line and subsequently is being brought in for IV access and for further evaluation.  Patient had a telephone visit with Dr. Meier at [facility] to re-establish November 22.  Please refer to that note for details of patient's cancer diagnosis and treatment.  Upon arrival to the emergency room today patient's blood pressure was 96/78 pulse of 137.  Patient's was treated with IV fluid resuscitation with significant improvement of his blood pressure as well as his pulse.  Blood work shows glucose of 264 nonfasting state.  BMP significant for a bicarb of only 20 anion gap 16 with patient's lactic acid initially of 2.2 but after hydration 1.6.  BUN is 32 and creatinine of 1.15 with baseline creatinine approximately 1.0.  White count 7200 hemoglobin of  8.9 platelets of only 46,000.  Venous gases pH is 7.43 with pCO2 of 31.  Urinalysis is positive for protein glucose blood ketone leukocyte Estrace.  Patient has 11-20 RBCs 6-10 WBCs 2+ bacteria.  Imaging shows extensive adenopathy in the thoracic cavity.  Patient has enlarging nodes 4.6 x 4.3 cm in size right infrahilar mass measuring now 4.8 x 4.6 cm in size innumerable pulmonary nodules severely increased from previous studies.  Liver has marked abnormal liver with multiple solid masses present new and enlarged from previous studies.  Pancreas has a heterogeneous decreased density in the region of the pancreatic head near the junction of the head and the body.  Nonspecific but cannot exclude tumor.  Right adrenal mass increasing in size appears to be invading adjacent liver.  Patient has evidence of new mild left hydronephrosis with a left ureter traced down into an area of abnormal soft tissue likely a tumor left side of the pelvis which is probably obstructing the ureter.  Patient with large irregular area of abnormal soft tissue density occupying much of the left side of the pelvis most likely appears to be tumor significant increase from previous studies likely causing obstruction of the left distal ureter and encases the iliac vessels..  Patient is being admitted to the hospital for what appears to be potential of early sepsis from UTI with patient having evidence of obstructed left ureter with mild hydronephrosis.  Patient with quite extensive metastatic melanoma including mets to the brain liver lungs and pelvis.  Patient will be established with oncology for further recommendations and stabilization prior to following up with [facility] oncology.  Patient will be continued on meropenem for his UTI with supportive IV fluids supportive care otherwise.  Patient's active CODE STATUS is full code as family is not ready to accept palliative care at this present time.    Patient was admitted with a diagnosis of  complicated UTI with left-sided hydronephrosis-restarted started on IV meropenem.  Urology consulted    12/02: Patient was evaluated this a.m., pleasantly confused, does not seem to be any distress.  No fever or chills, no nausea or vomiting.  12/03: Patient remains lethargic but responds to simple questions.  Does not seem to be in respiratory distress.    Objective   Last Recorded Vitals  /81 (BP Location: Right arm, Patient Position: Lying)   Pulse 102   Temp 35.9 °C (96.6 °F) (Temporal)   Resp 20   Wt 106 kg (234 lb 9.1 oz)   SpO2 (!) 89%   Intake/Output last 3 Shifts:    Intake/Output Summary (Last 24 hours) at 12/3/2023 1317  Last data filed at 12/3/2023 0500  Gross per 24 hour   Intake 997.5 ml   Output 1350 ml   Net -352.5 ml         Admission Weight  Weight: 121 kg (266 lb 15.6 oz) (12/01/23 2125)    Daily Weight  12/03/23 : 106 kg (234 lb 9.1 oz)    Image Results  CT angio chest for pulmonary embolism  Narrative: STUDY:  CT Angiogram of the Chest, CT Abdomen and Pelvis with IV Contrast;  12/2/2023 at 1:07 a.m.  INDICATION:  Abdominal pain medial to ostomy site.  Additional History:  Melanoma.  COMPARISON:  CT CAP 5/22/2023.  CTA chest 1/18/2021.  ACCESSION NUMBER(S):  [id number], [id number]  ORDERING CLINICIAN:  NINI KILPATRICK  TECHNIQUE:  CTA of the chest was performed following rapid injection  of intravenous contrast.  Images are reviewed and processed at a  workstation according to the CT angiogram protocol with 3-D and/or MIP  post processing imaging generated.  CT of the abdomen and pelvis was  performed with intravenous contrast.  Omnipaque 350 100 mL was  administered intravenously; positive oral contrast was given.  Automated mA/kV exposure control was utilized and patient examination  was performed in strict accordance with principles of ALARA.  FINDINGS:    CTA CHEST:  Adenopathy in the hilar regions distorts the pulmonary vessels.   Pulmonary arterial enhancement is  inhomogeneous.  I do not see clear  evidence of pulmonary emboli.  In the region of the left hilum there  may be some tumor invading the pulmonary veins and possibly the  pulmonary arteries.  The thoracic aorta is normal in course and caliber without dissection  or aneurysm.  There is a vascular access port over the right side of the chest.  The  catheter enters via a jugular approach and extends to the SVC.    The heart is normal in size without pericardial effusion.  Mildly  enlarged left axillary lymph nodes, new from prior.  Extensive  abnormal adenopathy in the chest, markedly increased from prior.  A  subcarinal lymph node which previously measured 3.1 x 2.3 cm currently  measures 4.6 x 4.3 cm and there are multiple additional enlarged  nodes, many of which are new from prior.  There is no pleural effusion, pleural thickening, or pneumothorax.    A right infrahilar mass which previously measured 3.9 x 3.8 cm  currently measures 4.8 x 4.6 cm.  The patient has innumerable  pulmonary nodules which have increased markedly from prior.  Bibasilar  pulmonary opacities, most suggestive of atelectasis.  ABDOMEN:     LIVER:  Markedly abnormal liver with multiple solid masses present, new and  enlarged from prior.  Detailed evaluation is hindered by artifact on  this scan but there are liver lesions measuring up to 12 cm present.     BILE DUCTS:  No intrahepatic or extrahepatic biliary ductal dilatation.     GALLBLADDER:  The gallbladder is is distended, but otherwise not grossly remarkable  in appearance.     STOMACH:  No abnormalities identified.     PANCREAS:  Heterogeneous decreased density in the region of the pancreatic head  and near the junction of the head and body.  This is difficult to  measure, but seems similar to the prior study.  This is nonspecific.   I cannot exclude tumor.  No ductal dilatation.     SPLEEN:  No splenomegaly or focal splenic lesion.     ADRENAL GLANDS:  There is a right adrenal mass  which has increased in size and now  appears to be invading the adjacent liver.     KIDNEYS AND URETERS:  Kidneys are normal in size and location.  Bilateral renal cysts, left  greater than right.  Small nonobstructing calculus in the lower pole  of the left kidney.  No right hydronephrosis but there appears to be  new mild left hydronephrosis.  The left ureter can be traced down into  an area of abnormal soft tissue, likely tumor, in the left side of the  pelvis which is probably obstructing the ureter.  I do not see a  definite ureteral calculus.     PELVIS:     BLADDER:  There is a Mays catheter in the bladder and the bladder is  decompressed.  The wall of the urinary bladder is diffusely thickened.     REPRODUCTIVE ORGANS:  No abnormalities identified.     BOWEL:  No abnormalities identified.     VESSELS:  No abnormalities identified.  Abdominal aorta is normal in caliber.      PERITONEUM/RETROPERITONEUM/LYMPH NODES:  No free fluid.  No pneumoperitoneum.  Numerous mildly enlarged retroperitoneal lymph nodes have increased in  size.  Numerous immediately enlarged iliac lymph nodes bilaterally,  left greater than right.  These are somewhat confluent and difficult  to measure.  Abnormal enlarged right inguinal lymph nodes have  increased from prior.     ABDOMINAL WALL:  There is abnormal soft tissue adjacent to the umbilicus which is  likely tumor, previously measuring 2.9 x 1.8 cm and currently  measuring 3.4 x 3.4 cm.  SOFT TISSUES:   There is a large irregular area of abnormal soft tissue density  occupying much of the left side of the pelvis.  This appears to be  tumor, significantly increased from prior.  This is likely occluding  the distal left ureter.  This encases the iliac vessels.  The iliac  arteries are probably patent.  I do not have sufficient contrast to  adequately evaluate the iliac veins but suspect the left iliac veins  are either occluded or significantly compressed.  There does appear to  be  significant associated soft tissue swelling/edema in the visualized  left upper leg.  The patient also has extensive tumor in the left  inguinal region extending to the skin surface.  This has increased  markedly from prior.  There are probable mesenteric implants of tumor throughout the  abdomen/pelvis, increased from prior.     BONES:  Old-appearing bilateral rib fractures.  Degenerative changes in the  spine.  Postoperative changes in the thoracolumbar spine with pedicle  screws and posterior fusion hardware present.  Postoperative changes  in the spine.  There are pedicle screws and posterior fusion rods  spanning T11-L2.  Alignment is unremarkable.  Stable compression of  the L1 vertebral body with mild posterior displacement of the  vertebral body narrowing the canal.  There has been posterior  decompressive surgery at this level.  No bony destructive lesions are  seen with certainty.  Impression: Extensive metastatic disease in the chest, abdomen, and pelvis,  markedly increased from prior imaging of 5/22/2023.  No clear evidence of pulmonary embolism although patient factors  hinder the sensitivity of this study.  Tumor in the hilar regions  encases and distorts the pulmonary vessels and in the left hilum there  may be some direct invasion of pulmonary veins or pulmonary arteries.  Mildly increased markings in the lung bases, likely atelectasis.  I  cannot exclude mild basilar infiltrates.  Normal heart size.  Normal enhancement of the aorta.  Degenerative and postoperative changes in the spine with posterior  fusion hardware spanning T11-L2.  Stable compression fracture at L1.   Old-appearing bilateral rib fractures.  New left hydronephrosis, likely due to tumor in the left side of the  pelvis which has increased markedly.  There is a nonobstructing  calculus in the lower pole of the left kidney but no ureteral calculi  are apparent.  There is a Mays catheter in the bladder and the bladder  is  decompressed.  Abnormal diffuse thickening of the wall of the urinary  bladder.  Tumor in the left side of the pelvis encases the iliac vessels.  I  suspect the left iliac veins are occluded or significantly compressed  and there appears to be associated edema in the visualized left leg.  Signed by Garret Gomez MD  CT abdomen pelvis w IV contrast  Narrative: STUDY:  CT Angiogram of the Chest, CT Abdomen and Pelvis with IV Contrast;  12/2/2023 at 1:07 a.m.  INDICATION:  Abdominal pain medial to ostomy site.  Additional History:  Melanoma.  COMPARISON:  CT CAP 5/22/2023.  CTA chest 1/18/2021.  ACCESSION NUMBER(S):  [id number], [id number]  ORDERING CLINICIAN:  NINI KILPATRICK  TECHNIQUE:  CTA of the chest was performed following rapid injection  of intravenous contrast.  Images are reviewed and processed at a  workstation according to the CT angiogram protocol with 3-D and/or MIP  post processing imaging generated.  CT of the abdomen and pelvis was  performed with intravenous contrast.  Omnipaque 350 100 mL was  administered intravenously; positive oral contrast was given.  Automated mA/kV exposure control was utilized and patient examination  was performed in strict accordance with principles of ALARA.  FINDINGS:    CTA CHEST:  Adenopathy in the hilar regions distorts the pulmonary vessels.   Pulmonary arterial enhancement is inhomogeneous.  I do not see clear  evidence of pulmonary emboli.  In the region of the left hilum there  may be some tumor invading the pulmonary veins and possibly the  pulmonary arteries.  The thoracic aorta is normal in course and caliber without dissection  or aneurysm.  There is a vascular access port over the right side of the chest.  The  catheter enters via a jugular approach and extends to the SVC.    The heart is normal in size without pericardial effusion.  Mildly  enlarged left axillary lymph nodes, new from prior.  Extensive  abnormal adenopathy in the chest, markedly  increased from prior.  A  subcarinal lymph node which previously measured 3.1 x 2.3 cm currently  measures 4.6 x 4.3 cm and there are multiple additional enlarged  nodes, many of which are new from prior.  There is no pleural effusion, pleural thickening, or pneumothorax.    A right infrahilar mass which previously measured 3.9 x 3.8 cm  currently measures 4.8 x 4.6 cm.  The patient has innumerable  pulmonary nodules which have increased markedly from prior.  Bibasilar  pulmonary opacities, most suggestive of atelectasis.  ABDOMEN:     LIVER:  Markedly abnormal liver with multiple solid masses present, new and  enlarged from prior.  Detailed evaluation is hindered by artifact on  this scan but there are liver lesions measuring up to 12 cm present.     BILE DUCTS:  No intrahepatic or extrahepatic biliary ductal dilatation.     GALLBLADDER:  The gallbladder is is distended, but otherwise not grossly remarkable  in appearance.     STOMACH:  No abnormalities identified.     PANCREAS:  Heterogeneous decreased density in the region of the pancreatic head  and near the junction of the head and body.  This is difficult to  measure, but seems similar to the prior study.  This is nonspecific.   I cannot exclude tumor.  No ductal dilatation.     SPLEEN:  No splenomegaly or focal splenic lesion.     ADRENAL GLANDS:  There is a right adrenal mass which has increased in size and now  appears to be invading the adjacent liver.     KIDNEYS AND URETERS:  Kidneys are normal in size and location.  Bilateral renal cysts, left  greater than right.  Small nonobstructing calculus in the lower pole  of the left kidney.  No right hydronephrosis but there appears to be  new mild left hydronephrosis.  The left ureter can be traced down into  an area of abnormal soft tissue, likely tumor, in the left side of the  pelvis which is probably obstructing the ureter.  I do not see a  definite ureteral calculus.     PELVIS:     BLADDER:  There is a  Mays catheter in the bladder and the bladder is  decompressed.  The wall of the urinary bladder is diffusely thickened.     REPRODUCTIVE ORGANS:  No abnormalities identified.     BOWEL:  No abnormalities identified.     VESSELS:  No abnormalities identified.  Abdominal aorta is normal in caliber.      PERITONEUM/RETROPERITONEUM/LYMPH NODES:  No free fluid.  No pneumoperitoneum.  Numerous mildly enlarged retroperitoneal lymph nodes have increased in  size.  Numerous immediately enlarged iliac lymph nodes bilaterally,  left greater than right.  These are somewhat confluent and difficult  to measure.  Abnormal enlarged right inguinal lymph nodes have  increased from prior.     ABDOMINAL WALL:  There is abnormal soft tissue adjacent to the umbilicus which is  likely tumor, previously measuring 2.9 x 1.8 cm and currently  measuring 3.4 x 3.4 cm.  SOFT TISSUES:   There is a large irregular area of abnormal soft tissue density  occupying much of the left side of the pelvis.  This appears to be  tumor, significantly increased from prior.  This is likely occluding  the distal left ureter.  This encases the iliac vessels.  The iliac  arteries are probably patent.  I do not have sufficient contrast to  adequately evaluate the iliac veins but suspect the left iliac veins  are either occluded or significantly compressed.  There does appear to  be significant associated soft tissue swelling/edema in the visualized  left upper leg.  The patient also has extensive tumor in the left  inguinal region extending to the skin surface.  This has increased  markedly from prior.  There are probable mesenteric implants of tumor throughout the  abdomen/pelvis, increased from prior.     BONES:  Old-appearing bilateral rib fractures.  Degenerative changes in the  spine.  Postoperative changes in the thoracolumbar spine with pedicle  screws and posterior fusion hardware present.  Postoperative changes  in the spine.  There are pedicle screws  and posterior fusion rods  spanning T11-L2.  Alignment is unremarkable.  Stable compression of  the L1 vertebral body with mild posterior displacement of the  vertebral body narrowing the canal.  There has been posterior  decompressive surgery at this level.  No bony destructive lesions are  seen with certainty.  Impression: Extensive metastatic disease in the chest, abdomen, and pelvis,  markedly increased from prior imaging of 5/22/2023.  No clear evidence of pulmonary embolism although patient factors  hinder the sensitivity of this study.  Tumor in the hilar regions  encases and distorts the pulmonary vessels and in the left hilum there  may be some direct invasion of pulmonary veins or pulmonary arteries.  Mildly increased markings in the lung bases, likely atelectasis.  I  cannot exclude mild basilar infiltrates.  Normal heart size.  Normal enhancement of the aorta.  Degenerative and postoperative changes in the spine with posterior  fusion hardware spanning T11-L2.  Stable compression fracture at L1.   Old-appearing bilateral rib fractures.  New left hydronephrosis, likely due to tumor in the left side of the  pelvis which has increased markedly.  There is a nonobstructing  calculus in the lower pole of the left kidney but no ureteral calculi  are apparent.  There is a Mays catheter in the bladder and the bladder is  decompressed.  Abnormal diffuse thickening of the wall of the urinary  bladder.  Tumor in the left side of the pelvis encases the iliac vessels.  I  suspect the left iliac veins are occluded or significantly compressed  and there appears to be associated edema in the visualized left leg.  Signed by Garret Gomez MD  XR chest 1 view  Narrative: STUDY:  Chest Radiograph;  12/01/2023 at 11:48 PM  INDICATION:  Tachycardia.  COMPARISON:  XR chest 01/18/21, 12/19/20.  ACCESSION NUMBER(S):  [id number]  ORDERING CLINICIAN:  NINI KILPATRICK  TECHNIQUE:  Frontal chest was obtained at 2348  hours.  FINDINGS:  CARDIOMEDIASTINAL SILHOUETTE:  Cardiomediastinal silhouette is stable in size and configuration.   Right-sided chest port terminates in the distal SVC     LUNGS:  Diffuse nodular densities are identified throughout the lungs  suggesting metastatic disease.  More patchy airspace opacities are  also seen in the right perihilar and left midlung region suspicious  for superimposed infiltrates.  No large pleural effusion.  No  pneumothorax.     ABDOMEN:  No remarkable upper abdominal findings.     BONES:  No acute osseous changes.  Impression: Diffuse nodular densities throughout the lungs compatible with  metastatic disease.  In addition there are patchy airspace opacities  bilaterally suggesting superimposed infiltrates.  Signed by Italo Tejada MD      Physical Exam  Patient is lethargic, not in apparent distress  Eyes: PERRLA, no conjunctival congestion  Chest: Bilateral Air entry, no crackles or wheezing  Heart: s1S2 regular, no murmur  Abdomen: Soft, non tender, BS present,   Ext: Mild edema lower extremity, wound/lesion on left heel  Skin: Melanotic lesions on left thigh and abdomen wall  Relevant Results               Assessment/Plan   This patient currently has cardiac telemetry ordered; if you would like to modify or discontinue the telemetry order, click here to go to the orders activity to modify/discontinue the order.              Principal Problem:    Urinary tract infection without hematuria, site unspecified    #1 complicated UTI with left-sided hydronephrosis  Continue on meropenem  Follow cultures-blood cultures and urine cultures negative so far.        #2 metastatic melanoma      Patient with extensive metastatic disease and has exhibited in patient's CT of the chest abdomen and pelvis as well as known brain mets.  Patient is reestablishing with oncology at .  Will ask hospital oncology to see patient.      Family has not yet willing to accept palliative care or hospice care.      #3 encephalopathy      Patient currently is unresponsive but had just received narcotics.  Patient is known mets to the brain continues on Decadron.     #4  Anemia with thrombocytopenia      Possibly secondary to metastatic disease  DVT prophylaxis in form of SCDs only    #5 type 2 diabetes  Continue on basal and bolus insulin with sliding scale coverage  A1c-7.9    #6.  Vitamin D deficiency  Oral supplementation                   Danielle Bauman MD

## 2023-12-03 NOTE — CONSULTS
Reason For Consult  Metastatic melanoma, involving brain, lungs, liver, lymph nodes    History Of Present Illness  Reilly Altamirano is a 68 y.o. male with past medical history of extensive metastatic melanoma including mets to the brain abdomen lungs presented to the emergency room due to decreased mental status and for loss of IV access for treatment of UTI with meropenem.  Patient has just come to this area from treatment at [facility].     Initially patient had diagnosis of malignant melanoma of left heel, stage IIIb, T4 N3 M0, status post wide resection and started adjuvant nivolumab.  Patient has recurrent disease and they are treated with TVES and Mektovi, unfortunately patient has progressive disease which are documented by repeated PET scan.    Patient was evaluated at [facility] and participate in  TIL protocol, to include CD4 and T-cell infusion and conditioning regimen includes cyclophosphamide and fludarabine.  Patient required long-term hospitalization and unfortunately patient has progression of disease.  Palliative care was offered.    Patient came to the hospital because of IV antibiotic for UTI and patient is confused not able to give good history   Imaging shows extensive adenopathy in the thoracic cavity.  Patient has enlarging nodes 4.6 x 4.3 cm in size right infrahilar mass measuring now 4.8 x 4.6 cm in size innumerable pulmonary nodules severely increased from previous studies.    Medical oncology consultation was requested for possible further treatment.    Today patient is awake still not able to give any history he has some shortness of breath no acute discomfort, medical record reviewed     Past Medical History  He has a past medical history of Abnormal electromyogram (EMG), Abnormal findings on diagnostic imaging of other abdominal regions, including retroperitoneum, Abnormal findings on diagnostic imaging of other specified body structures, Cardiomegaly  (12/15/2020), Cellulitis of left lower limb (12/08/2021), Cellulitis of left lower limb (05/23/2022), Chronic pain syndrome (06/24/2021), Depression, unspecified (07/07/2021), Encounter for other preprocedural examination (07/07/2021), Encounter for preprocedural cardiovascular examination (07/07/2021), Generalized anxiety disorder (09/01/2020), Long term (current) use of opiate analgesic (07/07/2021), Neoplasm of unspecified behavior of digestive system (09/07/2021), Other conditions influencing health status (04/14/2021), Other mucopurulent conjunctivitis, unspecified eye (08/25/2020), Other neuromuscular dysfunction of bladder (12/30/2019), Other specified disorders of adrenal gland (CMS/[facility]) (07/07/2021), Other specified soft tissue disorders (11/16/2020), Other specified symptoms and signs involving the digestive system and abdomen (07/16/2020), Personal history of (healed) traumatic fracture (07/10/2020), Personal history of diabetic foot ulcer (04/14/2021), Personal history of malignant melanoma of skin (05/15/2021), Personal history of other diseases of the circulatory system, Personal history of other diseases of the circulatory system (07/20/2021), Personal history of other endocrine, nutritional and metabolic disease (06/08/2021), Personal history of other medical treatment, Personal history of other medical treatment, Personal history of other specified conditions (08/06/2021), Personal history of other specified conditions (12/30/2019), Postprocedural seroma of a musculoskeletal structure following other procedure (02/13/2020), and Type 2 diabetes mellitus with foot ulcer (CODE) (CMS/[facility]) (07/07/2021).    Surgical History  He has a past surgical history that includes Knee surgery (03/02/2018); Other surgical history (04/06/2020); Other surgical history (08/21/2020); Other surgical history (08/21/2020); Other surgical history (04/14/2021); and US guided abscess drain (11/17/2022).     Social History  He  "reports that he has never smoked. He has never used smokeless tobacco. He reports that he does not currently use alcohol. He reports that he does not use drugs.    Family History  Family History   Problem Relation Name Age of Onset    Diabetes Other      Cancer Other          Allergies  Bee venom protein (honey bee)    Review of Systems  No acute discomfort patient is confused, complaining of weakness and fatigue   Last Recorded Vitals  Blood pressure 128/80, pulse 102, temperature 36.7 °C (98 °F), temperature source Temporal, resp. rate 20, height 1.93 m (6' 4\"), weight 106 kg (234 lb 9.1 oz), SpO2 90 %.  Physical Exam  Vital stable, confused    Neck supple    Lung examination did show poor air movement on the both side    Heart with normal regular rhythm    Abdomen soft nontender no hepatosplenomegaly    Extremities no edema    Neuro examination is limited      Relevant Results  CT of the abdominal pelvis and chest , Extensive metastatic disease in the chest, abdomen, and pelvis,  markedly increased from prior imaging of 5/22/2023.  No clear evidence of pulmonary embolism although patient factors  hinder the sensitivity of this study.  Tumor in the hilar regions  encases and distorts the pulmonary vessels and in the left hilum there  may be some direct invasion of pulmonary veins or pulmonary arteries.  Mildly increased markings in the lung bases, likely atelectasis.  I  cannot exclude mild basilar infiltrates.  Normal heart size.  Normal enhancement of the aorta.  Degenerative and postoperative changes in the spine with posterior  fusion hardware spanning T11-L2.  Stable compression fracture at L1.   Old-appearing bilateral rib fractures.  New left hydronephrosis, likely due to tumor in the left side of the  pelvis which has increased markedly.  There is a nonobstructing  calculus in the lower pole of the left kidney but no ureteral calculi  are apparent.  There is a Mays catheter in the bladder and the bladder " is  decompressed.  Abnormal diffuse thickening of the wall of the urinary  bladder.  Tumor in the left side of the pelvis encases the iliac vessels.  I  suspect the left iliac veins are occluded or significantly compressed  and there appears to be associated edema in the visualized left leg.  Signed by Garret Gomez MD     Assessment/Plan     #1 extensive metastatic melanoma involving lung, bone, liver, brain    Previous treatment wide resection including left inguinal lymph node dissection and initial stages 3D, status post wide resection and adjuvant nivolumab, intra transit recurrent, status post T VES, recurrent disease treated with Mektovi, progressive disease, participate in TIL protocol, progressive disease.    Medical records from [facility] and [facility] reviewed patient has progressive disease.  Metastatic site include brain, lungs, lymph node, liver, performance status ECOG 4, unfortunately there is no any effective treatment that is available which we can offer to this gentleman.  The best approach will be hospice care.  In the meantime we will continue supportive care.  I spent 45 minutes in the professional and overall care of this patient.      Steve Hendrix MD

## 2023-12-04 NOTE — CARE PLAN
The patient's goals for the shift include stephanie    The clinical goals for the shift include pt to have no falls by end of shift    Over the shift, the patient did not make progress toward the following goals. Barriers to progression include pt nonresponsive, unable to learn or grasp new thiings. Recommendations to address these barriers include discuss with family end of life decisions..    1600 see rapid response sheet. Pt now on bipat    1830 Wife came to bedside to further discuss end of life care. Stated she wanted pt to remain full code until am and discuss with attending physician pt's condition and whether or not to continue with pt as a full code. Dr. Steiner  in unit and notified of above. Went to bedside to discuss this plan with pt's family.

## 2023-12-04 NOTE — PROGRESS NOTES
Spiritual Care Visit    Clinical Encounter Type  Visited With: Patient and family together  Routine Visit: Introduction    Muslim Encounters  Muslim Needs: Prayer

## 2023-12-04 NOTE — PROGRESS NOTES
"Reilly Altamirano is a 68 y.o. male on day 2 of admission presenting with confusion, shortness of breath, UTI and history of metastatic melanoma.    Subjective   Today patient is confused, not responding requiring oxygen supplement some shortness of breath no discomfort, family is present in the room       Objective     Physical Exam  Patient not responding to verbal command    Mild shortness of breath respiratory rate 21    Heart with normal regular rhythm heart rate 95    Lungs decreased breath sounds on both sidesLast Recorded Vitals  Blood pressure 111/72, pulse 109, temperature 37.6 °C (99.6 °F), temperature source Temporal, resp. rate (!) 30, height 1.93 m (6' 4\"), weight 106 kg (234 lb 9.1 oz), SpO2 100 %.  Intake/Output last 3 Shifts:  I/O last 3 completed shifts:  In: 513.8 (4.8 mL/kg) [I.V.:513.8 (4.8 mL/kg)]  Out: 1450 (13.6 mL/kg) [Urine:1450 (0.4 mL/kg/hr)]  Weight: 106.4 kg     Relevant Results     Latest Reference Range & Units 12/04/23 06:16   GLUCOSE 74 - 99 mg/dL 369 (H)   SODIUM 136 - 145 mmol/L 144   POTASSIUM 3.5 - 5.3 mmol/L 4.9   CHLORIDE 98 - 107 mmol/L 115 (H)   Bicarbonate 21 - 32 mmol/L 17 (L)   Anion Gap 10 - 20 mmol/L 17   Blood Urea Nitrogen 6 - 23 mg/dL 54 (H)   Creatinine 0.50 - 1.30 mg/dL 2.00 (H)   EGFR >60 mL/min/1.73m*2 35 (L)   Calcium 8.6 - 10.3 mg/dL 7.7 (L)   MAGNESIUM 1.60 - 2.40 mg/dL 1.76   WBC 4.4 - 11.3 x10*3/uL 9.0   nRBC 0.0 - 0.0 /100 WBCs 0.2 (H)   RBC 4.50 - 5.90 x10*6/uL 2.28 (L)   HEMOGLOBIN 13.5 - 17.5 g/dL 8.0 (L)   HEMATOCRIT 41.0 - 52.0 % 26.6 (L)   MCV 80 - 100 fL 117 (H)   MCH 26.0 - 34.0 pg 35.1 (H)   MCHC 32.0 - 36.0 g/dL 30.1 (L)   RED CELL DISTRIBUTION WIDTH 11.5 - 14.5 % 22.8 (H)   Platelets 150 - 450 x10*3/uL 42 (L)   (       Assessment/Plan     #1 extensive metastatic melanoma involving lung, bone, liver, brain    2.  Respiratory failure, UTI abnormal renal function    Clinically condition is getting worse, discussed with the family patient is a very poor " prognosis and at this time is not a candidate for any systemic therapy following metastatic melanoma.  Family is in the process to make final decisions regarding palliative care and hospice care.  They are willing to talk with the hospice service.  Will request hospice evaluation.     I spent 30 minutes in the professional and overall care of this patient.      Steve Hendrix MD

## 2023-12-04 NOTE — PROGRESS NOTES
"Former[facility], later [facility]: [id number]  Reilly Altamirano    Hospice Consents  signed.      [facility] SW met with pt spouse Cathie and son Douglas.  Multiple other family members initially present as well.  Discussed hospice care and services.  Discussed what hospice looks like in the hospital setting.   Pt on bipap currently. Observed at times to be breathing over 40/min.  No facial or bodily signs of distress other than breathing.    Pt spouse made clear from onset of the meeting that she is not ready for bipap to be removed today.  Extended family members present express the opinion that it is time for hospice and to remove bipap.  Their son Douglas requested to speak to his mother alone.    When Douglas and Cathie spoke again with this SW, spouse continues to state that she is not ready to remove bipap.  She feels she is being reasonable in requesting time to \"ease\" into the idea of removing it and stated numerous times that it hasn't even been 24hrs on it.  Discussed pt comfort in the meantime and if she might be willing to have the nurse discuss adding anything to help pt breathing be calmer.  Pt spouse having difficulty seeing pt breathing patterns as discomfort, stating she does not see him in pain as his face is relaxed as is body.    Ultimately Cathie decided to complete hospice consents today, but family very aware that hospice does not officially start until [facility] RN assesses pt and coordinates a hospice admission at the hospital.  She is in agreement with hospice RN visit tomorrow but states she is not sure she will be ready to move forward with Withdraw of care tomorrow.    No changes to code status and No GIP admission at this time.    [facility] RN will be requested to see pt/family tomorrow and further discussions.      Updated floor RN Meaghan CHAMPAGNE.  Updated Dr. Bauman and Referral source Anca Bravo via EPIC chat.   Harleen Walton, JASMINA  [phone] and EPIC chat.  "

## 2023-12-04 NOTE — PROGRESS NOTES
Patient admitted from [facility] with UTI. Per discussion with care team, Dr Bauman has placed a consult to hospice. SW has made a referral to [facility] as Dr Bauman has identified that patient could be GIP appropriate. Awaiting outcome of hospice meeting and will ask [facility] to send  a referral to [facility] in the meantime, to determine patient's status there and ability to return.

## 2023-12-04 NOTE — PROGRESS NOTES
Speech-Language Pathology                 Therapy Communication Note    Patient Name: Reilly Altamirano  MRN: [id number]  Today's Date: 12/4/2023     Discipline: Speech Language Pathology    Missed Visit Reason: Pt is currently on a BiPAP machine and not appropriate for swallow reassessment at this time.     Missed Time: Attempt    Comment:  Keep pt NPO.  Only feed if off BiPAP and awake and alert.  He is on a modified diet of Puree diet and HONEY Thick Liquids.  Family has a hospice meeting this afternoon.

## 2023-12-04 NOTE — CARE PLAN
The patient's goals for the shift include stephanie    The clinical goals for the shift include pt to have no falls by end of shift

## 2023-12-04 NOTE — PROGRESS NOTES
Reilly Altamirano is a 68 y.o. male on day 2 of admission presenting with Urinary tract infection without hematuria, site unspecified.      Subjective      Reilly Altamirano is a 68 y.o. male with past medical history of extensive metastatic melanoma including mets to the brain abdomen lungs presented to the emergency room due to decreased mental status and for loss of IV access for treatment of UTI with meropenem.  Patient has just come to this area from treatment at [facility].  Patient is unable to provide me any information but he did just receive morphine in minutes before I saw him.  All information is from ER physician comments as well as old records available to us.  Apparently patient was recommended to pursue palliative care and/or hospice care from his treating physicians at [facility].  Patient has come searching for continued hope and treatment at another facility.  Patient had been seen actually at [facility] last week and started on Bactrim for a UTI and sent back to the nursing facility.  He was placed on meropenem and had PICC line put in place.  Patient became confused and accidentally pulled his central line and subsequently is being brought in for IV access and for further evaluation.  Patient had a telephone visit with Dr. Meier at [facility] to re-establish November 22.  Please refer to that note for details of patient's cancer diagnosis and treatment.  Upon arrival to the emergency room today patient's blood pressure was 96/78 pulse of 137.  Patient's was treated with IV fluid resuscitation with significant improvement of his blood pressure as well as his pulse.  Blood work shows glucose of 264 nonfasting state.  BMP significant for a bicarb of only 20 anion gap 16 with patient's lactic acid initially of 2.2 but after hydration 1.6.  BUN is 32 and creatinine of 1.15 with baseline creatinine approximately 1.0.  White count 7200 hemoglobin of  8.9 platelets of only 46,000.  Venous gases pH is 7.43 with pCO2 of 31.  Urinalysis is positive for protein glucose blood ketone leukocyte Estrace.  Patient has 11-20 RBCs 6-10 WBCs 2+ bacteria.  Imaging shows extensive adenopathy in the thoracic cavity.  Patient has enlarging nodes 4.6 x 4.3 cm in size right infrahilar mass measuring now 4.8 x 4.6 cm in size innumerable pulmonary nodules severely increased from previous studies.  Liver has marked abnormal liver with multiple solid masses present new and enlarged from previous studies.  Pancreas has a heterogeneous decreased density in the region of the pancreatic head near the junction of the head and the body.  Nonspecific but cannot exclude tumor.  Right adrenal mass increasing in size appears to be invading adjacent liver.  Patient has evidence of new mild left hydronephrosis with a left ureter traced down into an area of abnormal soft tissue likely a tumor left side of the pelvis which is probably obstructing the ureter.  Patient with large irregular area of abnormal soft tissue density occupying much of the left side of the pelvis most likely appears to be tumor significant increase from previous studies likely causing obstruction of the left distal ureter and encases the iliac vessels..  Patient is being admitted to the hospital for what appears to be potential of early sepsis from UTI with patient having evidence of obstructed left ureter with mild hydronephrosis.  Patient with quite extensive metastatic melanoma including mets to the brain liver lungs and pelvis.  Patient will be established with oncology for further recommendations and stabilization prior to following up with [facility] oncology.  Patient will be continued on meropenem for his UTI with supportive IV fluids supportive care otherwise.  Patient's active CODE STATUS is full code as family is not ready to accept palliative care at this present time.    Patient was admitted with a diagnosis of  complicated UTI with left-sided hydronephrosis-restarted started on IV meropenem.  Urology consulted    12/02: Patient was evaluated this a.m., pleasantly confused, does not seem to be any distress.  No fever or chills, no nausea or vomiting.  12/03: Patient remains lethargic but responds to simple questions.  Does not seem to be in respiratory distress.    12/04: Overnight patient developed respiratory distress requiring BiPAP.  This morning patient remains on BiPAP, unresponsive.  Family members at the bedside, ongoing discussion regarding transition to hospice    Objective   Last Recorded Vitals  /72 (BP Location: Right arm, Patient Position: Lying)   Pulse 109   Temp 37.6 °C (99.6 °F) (Temporal)   Resp (!) 30   Wt 106 kg (234 lb 9.1 oz)   SpO2 100%   Intake/Output last 3 Shifts:    Intake/Output Summary (Last 24 hours) at 12/4/2023 1118  Last data filed at 12/4/2023 0010  Gross per 24 hour   Intake --   Output 450 ml   Net -450 ml         Admission Weight  Weight: 121 kg (266 lb 15.6 oz) (12/01/23 2125)    Daily Weight  12/03/23 : 106 kg (234 lb 9.1 oz)    Image Results  CT angio chest for pulmonary embolism  Narrative: STUDY:  CT Angiogram of the Chest, CT Abdomen and Pelvis with IV Contrast;  12/2/2023 at 1:07 a.m.  INDICATION:  Abdominal pain medial to ostomy site.  Additional History:  Melanoma.  COMPARISON:  CT CAP 5/22/2023.  CTA chest 1/18/2021.  ACCESSION NUMBER(S):  [id number], [id number]  ORDERING CLINICIAN:  NINI KILPATRICK  TECHNIQUE:  CTA of the chest was performed following rapid injection  of intravenous contrast.  Images are reviewed and processed at a  workstation according to the CT angiogram protocol with 3-D and/or MIP  post processing imaging generated.  CT of the abdomen and pelvis was  performed with intravenous contrast.  Omnipaque 350 100 mL was  administered intravenously; positive oral contrast was given.  Automated mA/kV exposure control was utilized and patient  examination  was performed in strict accordance with principles of ALARA.  FINDINGS:    CTA CHEST:  Adenopathy in the hilar regions distorts the pulmonary vessels.   Pulmonary arterial enhancement is inhomogeneous.  I do not see clear  evidence of pulmonary emboli.  In the region of the left hilum there  may be some tumor invading the pulmonary veins and possibly the  pulmonary arteries.  The thoracic aorta is normal in course and caliber without dissection  or aneurysm.  There is a vascular access port over the right side of the chest.  The  catheter enters via a jugular approach and extends to the SVC.    The heart is normal in size without pericardial effusion.  Mildly  enlarged left axillary lymph nodes, new from prior.  Extensive  abnormal adenopathy in the chest, markedly increased from prior.  A  subcarinal lymph node which previously measured 3.1 x 2.3 cm currently  measures 4.6 x 4.3 cm and there are multiple additional enlarged  nodes, many of which are new from prior.  There is no pleural effusion, pleural thickening, or pneumothorax.    A right infrahilar mass which previously measured 3.9 x 3.8 cm  currently measures 4.8 x 4.6 cm.  The patient has innumerable  pulmonary nodules which have increased markedly from prior.  Bibasilar  pulmonary opacities, most suggestive of atelectasis.  ABDOMEN:     LIVER:  Markedly abnormal liver with multiple solid masses present, new and  enlarged from prior.  Detailed evaluation is hindered by artifact on  this scan but there are liver lesions measuring up to 12 cm present.     BILE DUCTS:  No intrahepatic or extrahepatic biliary ductal dilatation.     GALLBLADDER:  The gallbladder is is distended, but otherwise not grossly remarkable  in appearance.     STOMACH:  No abnormalities identified.     PANCREAS:  Heterogeneous decreased density in the region of the pancreatic head  and near the junction of the head and body.  This is difficult to  measure, but seems similar  to the prior study.  This is nonspecific.   I cannot exclude tumor.  No ductal dilatation.     SPLEEN:  No splenomegaly or focal splenic lesion.     ADRENAL GLANDS:  There is a right adrenal mass which has increased in size and now  appears to be invading the adjacent liver.     KIDNEYS AND URETERS:  Kidneys are normal in size and location.  Bilateral renal cysts, left  greater than right.  Small nonobstructing calculus in the lower pole  of the left kidney.  No right hydronephrosis but there appears to be  new mild left hydronephrosis.  The left ureter can be traced down into  an area of abnormal soft tissue, likely tumor, in the left side of the  pelvis which is probably obstructing the ureter.  I do not see a  definite ureteral calculus.     PELVIS:     BLADDER:  There is a Mays catheter in the bladder and the bladder is  decompressed.  The wall of the urinary bladder is diffusely thickened.     REPRODUCTIVE ORGANS:  No abnormalities identified.     BOWEL:  No abnormalities identified.     VESSELS:  No abnormalities identified.  Abdominal aorta is normal in caliber.      PERITONEUM/RETROPERITONEUM/LYMPH NODES:  No free fluid.  No pneumoperitoneum.  Numerous mildly enlarged retroperitoneal lymph nodes have increased in  size.  Numerous immediately enlarged iliac lymph nodes bilaterally,  left greater than right.  These are somewhat confluent and difficult  to measure.  Abnormal enlarged right inguinal lymph nodes have  increased from prior.     ABDOMINAL WALL:  There is abnormal soft tissue adjacent to the umbilicus which is  likely tumor, previously measuring 2.9 x 1.8 cm and currently  measuring 3.4 x 3.4 cm.  SOFT TISSUES:   There is a large irregular area of abnormal soft tissue density  occupying much of the left side of the pelvis.  This appears to be  tumor, significantly increased from prior.  This is likely occluding  the distal left ureter.  This encases the iliac vessels.  The iliac  arteries are  probably patent.  I do not have sufficient contrast to  adequately evaluate the iliac veins but suspect the left iliac veins  are either occluded or significantly compressed.  There does appear to  be significant associated soft tissue swelling/edema in the visualized  left upper leg.  The patient also has extensive tumor in the left  inguinal region extending to the skin surface.  This has increased  markedly from prior.  There are probable mesenteric implants of tumor throughout the  abdomen/pelvis, increased from prior.     BONES:  Old-appearing bilateral rib fractures.  Degenerative changes in the  spine.  Postoperative changes in the thoracolumbar spine with pedicle  screws and posterior fusion hardware present.  Postoperative changes  in the spine.  There are pedicle screws and posterior fusion rods  spanning T11-L2.  Alignment is unremarkable.  Stable compression of  the L1 vertebral body with mild posterior displacement of the  vertebral body narrowing the canal.  There has been posterior  decompressive surgery at this level.  No bony destructive lesions are  seen with certainty.  Impression: Extensive metastatic disease in the chest, abdomen, and pelvis,  markedly increased from prior imaging of 5/22/2023.  No clear evidence of pulmonary embolism although patient factors  hinder the sensitivity of this study.  Tumor in the hilar regions  encases and distorts the pulmonary vessels and in the left hilum there  may be some direct invasion of pulmonary veins or pulmonary arteries.  Mildly increased markings in the lung bases, likely atelectasis.  I  cannot exclude mild basilar infiltrates.  Normal heart size.  Normal enhancement of the aorta.  Degenerative and postoperative changes in the spine with posterior  fusion hardware spanning T11-L2.  Stable compression fracture at L1.   Old-appearing bilateral rib fractures.  New left hydronephrosis, likely due to tumor in the left side of the  pelvis which has  increased markedly.  There is a nonobstructing  calculus in the lower pole of the left kidney but no ureteral calculi  are apparent.  There is a Mays catheter in the bladder and the bladder is  decompressed.  Abnormal diffuse thickening of the wall of the urinary  bladder.  Tumor in the left side of the pelvis encases the iliac vessels.  I  suspect the left iliac veins are occluded or significantly compressed  and there appears to be associated edema in the visualized left leg.  Signed by Garret Gomez MD  CT abdomen pelvis w IV contrast  Narrative: STUDY:  CT Angiogram of the Chest, CT Abdomen and Pelvis with IV Contrast;  12/2/2023 at 1:07 a.m.  INDICATION:  Abdominal pain medial to ostomy site.  Additional History:  Melanoma.  COMPARISON:  CT CAP 5/22/2023.  CTA chest 1/18/2021.  ACCESSION NUMBER(S):  [id number], [id number]  ORDERING CLINICIAN:  NINI KILPATRICK  TECHNIQUE:  CTA of the chest was performed following rapid injection  of intravenous contrast.  Images are reviewed and processed at a  workstation according to the CT angiogram protocol with 3-D and/or MIP  post processing imaging generated.  CT of the abdomen and pelvis was  performed with intravenous contrast.  Omnipaque 350 100 mL was  administered intravenously; positive oral contrast was given.  Automated mA/kV exposure control was utilized and patient examination  was performed in strict accordance with principles of ALARA.  FINDINGS:    CTA CHEST:  Adenopathy in the hilar regions distorts the pulmonary vessels.   Pulmonary arterial enhancement is inhomogeneous.  I do not see clear  evidence of pulmonary emboli.  In the region of the left hilum there  may be some tumor invading the pulmonary veins and possibly the  pulmonary arteries.  The thoracic aorta is normal in course and caliber without dissection  or aneurysm.  There is a vascular access port over the right side of the chest.  The  catheter enters via a jugular approach and extends  to the SVC.    The heart is normal in size without pericardial effusion.  Mildly  enlarged left axillary lymph nodes, new from prior.  Extensive  abnormal adenopathy in the chest, markedly increased from prior.  A  subcarinal lymph node which previously measured 3.1 x 2.3 cm currently  measures 4.6 x 4.3 cm and there are multiple additional enlarged  nodes, many of which are new from prior.  There is no pleural effusion, pleural thickening, or pneumothorax.    A right infrahilar mass which previously measured 3.9 x 3.8 cm  currently measures 4.8 x 4.6 cm.  The patient has innumerable  pulmonary nodules which have increased markedly from prior.  Bibasilar  pulmonary opacities, most suggestive of atelectasis.  ABDOMEN:     LIVER:  Markedly abnormal liver with multiple solid masses present, new and  enlarged from prior.  Detailed evaluation is hindered by artifact on  this scan but there are liver lesions measuring up to 12 cm present.     BILE DUCTS:  No intrahepatic or extrahepatic biliary ductal dilatation.     GALLBLADDER:  The gallbladder is is distended, but otherwise not grossly remarkable  in appearance.     STOMACH:  No abnormalities identified.     PANCREAS:  Heterogeneous decreased density in the region of the pancreatic head  and near the junction of the head and body.  This is difficult to  measure, but seems similar to the prior study.  This is nonspecific.   I cannot exclude tumor.  No ductal dilatation.     SPLEEN:  No splenomegaly or focal splenic lesion.     ADRENAL GLANDS:  There is a right adrenal mass which has increased in size and now  appears to be invading the adjacent liver.     KIDNEYS AND URETERS:  Kidneys are normal in size and location.  Bilateral renal cysts, left  greater than right.  Small nonobstructing calculus in the lower pole  of the left kidney.  No right hydronephrosis but there appears to be  new mild left hydronephrosis.  The left ureter can be traced down into  an area of  abnormal soft tissue, likely tumor, in the left side of the  pelvis which is probably obstructing the ureter.  I do not see a  definite ureteral calculus.     PELVIS:     BLADDER:  There is a Masy catheter in the bladder and the bladder is  decompressed.  The wall of the urinary bladder is diffusely thickened.     REPRODUCTIVE ORGANS:  No abnormalities identified.     BOWEL:  No abnormalities identified.     VESSELS:  No abnormalities identified.  Abdominal aorta is normal in caliber.      PERITONEUM/RETROPERITONEUM/LYMPH NODES:  No free fluid.  No pneumoperitoneum.  Numerous mildly enlarged retroperitoneal lymph nodes have increased in  size.  Numerous immediately enlarged iliac lymph nodes bilaterally,  left greater than right.  These are somewhat confluent and difficult  to measure.  Abnormal enlarged right inguinal lymph nodes have  increased from prior.     ABDOMINAL WALL:  There is abnormal soft tissue adjacent to the umbilicus which is  likely tumor, previously measuring 2.9 x 1.8 cm and currently  measuring 3.4 x 3.4 cm.  SOFT TISSUES:   There is a large irregular area of abnormal soft tissue density  occupying much of the left side of the pelvis.  This appears to be  tumor, significantly increased from prior.  This is likely occluding  the distal left ureter.  This encases the iliac vessels.  The iliac  arteries are probably patent.  I do not have sufficient contrast to  adequately evaluate the iliac veins but suspect the left iliac veins  are either occluded or significantly compressed.  There does appear to  be significant associated soft tissue swelling/edema in the visualized  left upper leg.  The patient also has extensive tumor in the left  inguinal region extending to the skin surface.  This has increased  markedly from prior.  There are probable mesenteric implants of tumor throughout the  abdomen/pelvis, increased from prior.     BONES:  Old-appearing bilateral rib fractures.  Degenerative changes  in the  spine.  Postoperative changes in the thoracolumbar spine with pedicle  screws and posterior fusion hardware present.  Postoperative changes  in the spine.  There are pedicle screws and posterior fusion rods  spanning T11-L2.  Alignment is unremarkable.  Stable compression of  the L1 vertebral body with mild posterior displacement of the  vertebral body narrowing the canal.  There has been posterior  decompressive surgery at this level.  No bony destructive lesions are  seen with certainty.  Impression: Extensive metastatic disease in the chest, abdomen, and pelvis,  markedly increased from prior imaging of 5/22/2023.  No clear evidence of pulmonary embolism although patient factors  hinder the sensitivity of this study.  Tumor in the hilar regions  encases and distorts the pulmonary vessels and in the left hilum there  may be some direct invasion of pulmonary veins or pulmonary arteries.  Mildly increased markings in the lung bases, likely atelectasis.  I  cannot exclude mild basilar infiltrates.  Normal heart size.  Normal enhancement of the aorta.  Degenerative and postoperative changes in the spine with posterior  fusion hardware spanning T11-L2.  Stable compression fracture at L1.   Old-appearing bilateral rib fractures.  New left hydronephrosis, likely due to tumor in the left side of the  pelvis which has increased markedly.  There is a nonobstructing  calculus in the lower pole of the left kidney but no ureteral calculi  are apparent.  There is a Mays catheter in the bladder and the bladder is  decompressed.  Abnormal diffuse thickening of the wall of the urinary  bladder.  Tumor in the left side of the pelvis encases the iliac vessels.  I  suspect the left iliac veins are occluded or significantly compressed  and there appears to be associated edema in the visualized left leg.  Signed by Garret Gomez MD  XR chest 1 view  Narrative: STUDY:  Chest Radiograph;  12/01/2023 at 11:48  PM  INDICATION:  Tachycardia.  COMPARISON:  XR chest 01/18/21, 12/19/20.  ACCESSION NUMBER(S):  [id number]  ORDERING CLINICIAN:  NINI KILPATRICK  TECHNIQUE:  Frontal chest was obtained at 2348 hours.  FINDINGS:  CARDIOMEDIASTINAL SILHOUETTE:  Cardiomediastinal silhouette is stable in size and configuration.   Right-sided chest port terminates in the distal SVC     LUNGS:  Diffuse nodular densities are identified throughout the lungs  suggesting metastatic disease.  More patchy airspace opacities are  also seen in the right perihilar and left midlung region suspicious  for superimposed infiltrates.  No large pleural effusion.  No  pneumothorax.     ABDOMEN:  No remarkable upper abdominal findings.     BONES:  No acute osseous changes.  Impression: Diffuse nodular densities throughout the lungs compatible with  metastatic disease.  In addition there are patchy airspace opacities  bilaterally suggesting superimposed infiltrates.  Signed by Italo Tejada MD      Physical Exam  Patient is lethargic, not in apparent distress  Eyes: PERRLA, no conjunctival congestion  Chest: Bilateral Air entry, no crackles or wheezing  Heart: s1S2 regular, no murmur  Abdomen: Soft, non tender, BS present,   Ext: Mild edema lower extremity, wound/lesion on left heel  Skin: Melanotic lesions on left thigh and abdomen wall  Relevant Results               Assessment/Plan   This patient currently has cardiac telemetry ordered; if you would like to modify or discontinue the telemetry order, click here to go to the orders activity to modify/discontinue the order.              Principal Problem:    Urinary tract infection without hematuria, site unspecified    #1 complicated UTI with left-sided hydronephrosis  Completed treatment with meropenem for 10 days  Follow cultures-blood cultures and urine cultures negative so far.        #2 metastatic melanoma      Patient with extensive metastatic disease and has exhibited in patient's CT of the  chest abdomen and pelvis as well as known brain mets.  Patient is reestablishing with oncology at .  Will ask hospital oncology to see patient.      Family has not yet willing to accept palliative care or hospice care.     #3 encephalopathy      Patient currently is unresponsive but had just received narcotics.  Patient is known mets to the brain continues on Decadron.     #4  Anemia with thrombocytopenia      Possibly secondary to metastatic disease  DVT prophylaxis in form of SCDs only    #5 type 2 diabetes  Continue on basal and bolus insulin with sliding scale coverage  A1c-7.9    #6.  Vitamin D deficiency  Oral supplementation    #7: Acute hypoxic respiratory failure-secondary to metastatic melanoma to the lungs.  Patient is currently on BiPAP.  Family wished for DNR with no intubation and no chest compression.  Hospice consulted for possible hospice transition                   Danielle Bauman MD

## 2023-12-04 NOTE — PROGRESS NOTES
Per Dr. Bauman met with pt and family to discuss hospice options. Discussed [facility] (with financial disclosure) and community hospice options. Family chose [facility], referral placed in [facility], notified provider, bedside nurse, and T[facility].  Agency will contact family to arrange meeting and discuss services.

## 2023-12-05 NOTE — PROGRESS NOTES
Reilly Altamirano is a 68 y.o. male on day 3 of admission presenting with Urinary tract infection without hematuria, site unspecified.      Subjective      Reilly Altamirano is a 68 y.o. male with past medical history of extensive metastatic melanoma including mets to the brain abdomen lungs presented to the emergency room due to decreased mental status and for loss of IV access for treatment of UTI with meropenem.  Patient has just come to this area from treatment at [facility].  Patient is unable to provide me any information but he did just receive morphine in minutes before I saw him.  All information is from ER physician comments as well as old records available to us.  Apparently patient was recommended to pursue palliative care and/or hospice care from his treating physicians at [facility].  Patient has come searching for continued hope and treatment at another facility.  Patient had been seen actually at [facility] last week and started on Bactrim for a UTI and sent back to the nursing facility.  He was placed on meropenem and had PICC line put in place.  Patient became confused and accidentally pulled his central line and subsequently is being brought in for IV access and for further evaluation.  Patient had a telephone visit with Dr. Meier at [facility] to re-establish November 22.  Please refer to that note for details of patient's cancer diagnosis and treatment.  Upon arrival to the emergency room today patient's blood pressure was 96/78 pulse of 137.  Patient's was treated with IV fluid resuscitation with significant improvement of his blood pressure as well as his pulse.  Blood work shows glucose of 264 nonfasting state.  BMP significant for a bicarb of only 20 anion gap 16 with patient's lactic acid initially of 2.2 but after hydration 1.6.  BUN is 32 and creatinine of 1.15 with baseline creatinine approximately 1.0.  White count 7200 hemoglobin of  8.9 platelets of only 46,000.  Venous gases pH is 7.43 with pCO2 of 31.  Urinalysis is positive for protein glucose blood ketone leukocyte Estrace.  Patient has 11-20 RBCs 6-10 WBCs 2+ bacteria.  Imaging shows extensive adenopathy in the thoracic cavity.  Patient has enlarging nodes 4.6 x 4.3 cm in size right infrahilar mass measuring now 4.8 x 4.6 cm in size innumerable pulmonary nodules severely increased from previous studies.  Liver has marked abnormal liver with multiple solid masses present new and enlarged from previous studies.  Pancreas has a heterogeneous decreased density in the region of the pancreatic head near the junction of the head and the body.  Nonspecific but cannot exclude tumor.  Right adrenal mass increasing in size appears to be invading adjacent liver.  Patient has evidence of new mild left hydronephrosis with a left ureter traced down into an area of abnormal soft tissue likely a tumor left side of the pelvis which is probably obstructing the ureter.  Patient with large irregular area of abnormal soft tissue density occupying much of the left side of the pelvis most likely appears to be tumor significant increase from previous studies likely causing obstruction of the left distal ureter and encases the iliac vessels..  Patient is being admitted to the hospital for what appears to be potential of early sepsis from UTI with patient having evidence of obstructed left ureter with mild hydronephrosis.  Patient with quite extensive metastatic melanoma including mets to the brain liver lungs and pelvis.  Patient will be established with oncology for further recommendations and stabilization prior to following up with [facility] oncology.  Patient will be continued on meropenem for his UTI with supportive IV fluids supportive care otherwise.  Patient's active CODE STATUS is full code as family is not ready to accept palliative care at this present time.    Patient was admitted with a diagnosis of  complicated UTI with left-sided hydronephrosis-restarted started on IV meropenem.  Urology consulted    12/02: Patient was evaluated this a.m., pleasantly confused, does not seem to be any distress.  No fever or chills, no nausea or vomiting.  12/03: Patient remains lethargic but responds to simple questions.  Does not seem to be in respiratory distress.    12/04: Overnight patient developed respiratory distress requiring BiPAP.  This morning patient remains on BiPAP, unresponsive.  Family members at the bedside, ongoing discussion regarding transition to hospice  12/05: Patient remains on BiPAP- unresponsive.  Family not ready for hospice yet.    Objective   Last Recorded Vitals  BP 95/56 (BP Location: Right arm, Patient Position: Lying)   Pulse 101   Temp 35.9 °C (96.6 °F) (Temporal)   Resp (!) 33   Wt 105 kg (230 lb 6.4 oz)   SpO2 99%   Intake/Output last 3 Shifts:    Intake/Output Summary (Last 24 hours) at 12/5/2023 1440  Last data filed at 12/5/2023 0928  Gross per 24 hour   Intake 200 ml   Output 400 ml   Net -200 ml         Admission Weight  Weight: 121 kg (266 lb 15.6 oz) (12/01/23 2125)    Daily Weight  12/05/23 : 105 kg (230 lb 6.4 oz)    Image Results  CT angio chest for pulmonary embolism  Narrative: STUDY:  CT Angiogram of the Chest, CT Abdomen and Pelvis with IV Contrast;  12/2/2023 at 1:07 a.m.  INDICATION:  Abdominal pain medial to ostomy site.  Additional History:  Melanoma.  COMPARISON:  CT CAP 5/22/2023.  CTA chest 1/18/2021.  ACCESSION NUMBER(S):  [id number], [id number]  ORDERING CLINICIAN:  NINI KILPATRICK  TECHNIQUE:  CTA of the chest was performed following rapid injection  of intravenous contrast.  Images are reviewed and processed at a  workstation according to the CT angiogram protocol with 3-D and/or MIP  post processing imaging generated.  CT of the abdomen and pelvis was  performed with intravenous contrast.  Omnipaque 350 100 mL was  administered intravenously; positive  oral contrast was given.  Automated mA/kV exposure control was utilized and patient examination  was performed in strict accordance with principles of ALARA.  FINDINGS:    CTA CHEST:  Adenopathy in the hilar regions distorts the pulmonary vessels.   Pulmonary arterial enhancement is inhomogeneous.  I do not see clear  evidence of pulmonary emboli.  In the region of the left hilum there  may be some tumor invading the pulmonary veins and possibly the  pulmonary arteries.  The thoracic aorta is normal in course and caliber without dissection  or aneurysm.  There is a vascular access port over the right side of the chest.  The  catheter enters via a jugular approach and extends to the SVC.    The heart is normal in size without pericardial effusion.  Mildly  enlarged left axillary lymph nodes, new from prior.  Extensive  abnormal adenopathy in the chest, markedly increased from prior.  A  subcarinal lymph node which previously measured 3.1 x 2.3 cm currently  measures 4.6 x 4.3 cm and there are multiple additional enlarged  nodes, many of which are new from prior.  There is no pleural effusion, pleural thickening, or pneumothorax.    A right infrahilar mass which previously measured 3.9 x 3.8 cm  currently measures 4.8 x 4.6 cm.  The patient has innumerable  pulmonary nodules which have increased markedly from prior.  Bibasilar  pulmonary opacities, most suggestive of atelectasis.  ABDOMEN:     LIVER:  Markedly abnormal liver with multiple solid masses present, new and  enlarged from prior.  Detailed evaluation is hindered by artifact on  this scan but there are liver lesions measuring up to 12 cm present.     BILE DUCTS:  No intrahepatic or extrahepatic biliary ductal dilatation.     GALLBLADDER:  The gallbladder is is distended, but otherwise not grossly remarkable  in appearance.     STOMACH:  No abnormalities identified.     PANCREAS:  Heterogeneous decreased density in the region of the pancreatic head  and near  the junction of the head and body.  This is difficult to  measure, but seems similar to the prior study.  This is nonspecific.   I cannot exclude tumor.  No ductal dilatation.     SPLEEN:  No splenomegaly or focal splenic lesion.     ADRENAL GLANDS:  There is a right adrenal mass which has increased in size and now  appears to be invading the adjacent liver.     KIDNEYS AND URETERS:  Kidneys are normal in size and location.  Bilateral renal cysts, left  greater than right.  Small nonobstructing calculus in the lower pole  of the left kidney.  No right hydronephrosis but there appears to be  new mild left hydronephrosis.  The left ureter can be traced down into  an area of abnormal soft tissue, likely tumor, in the left side of the  pelvis which is probably obstructing the ureter.  I do not see a  definite ureteral calculus.     PELVIS:     BLADDER:  There is a Mays catheter in the bladder and the bladder is  decompressed.  The wall of the urinary bladder is diffusely thickened.     REPRODUCTIVE ORGANS:  No abnormalities identified.     BOWEL:  No abnormalities identified.     VESSELS:  No abnormalities identified.  Abdominal aorta is normal in caliber.      PERITONEUM/RETROPERITONEUM/LYMPH NODES:  No free fluid.  No pneumoperitoneum.  Numerous mildly enlarged retroperitoneal lymph nodes have increased in  size.  Numerous immediately enlarged iliac lymph nodes bilaterally,  left greater than right.  These are somewhat confluent and difficult  to measure.  Abnormal enlarged right inguinal lymph nodes have  increased from prior.     ABDOMINAL WALL:  There is abnormal soft tissue adjacent to the umbilicus which is  likely tumor, previously measuring 2.9 x 1.8 cm and currently  measuring 3.4 x 3.4 cm.  SOFT TISSUES:   There is a large irregular area of abnormal soft tissue density  occupying much of the left side of the pelvis.  This appears to be  tumor, significantly increased from prior.  This is likely  occluding  the distal left ureter.  This encases the iliac vessels.  The iliac  arteries are probably patent.  I do not have sufficient contrast to  adequately evaluate the iliac veins but suspect the left iliac veins  are either occluded or significantly compressed.  There does appear to  be significant associated soft tissue swelling/edema in the visualized  left upper leg.  The patient also has extensive tumor in the left  inguinal region extending to the skin surface.  This has increased  markedly from prior.  There are probable mesenteric implants of tumor throughout the  abdomen/pelvis, increased from prior.     BONES:  Old-appearing bilateral rib fractures.  Degenerative changes in the  spine.  Postoperative changes in the thoracolumbar spine with pedicle  screws and posterior fusion hardware present.  Postoperative changes  in the spine.  There are pedicle screws and posterior fusion rods  spanning T11-L2.  Alignment is unremarkable.  Stable compression of  the L1 vertebral body with mild posterior displacement of the  vertebral body narrowing the canal.  There has been posterior  decompressive surgery at this level.  No bony destructive lesions are  seen with certainty.  Impression: Extensive metastatic disease in the chest, abdomen, and pelvis,  markedly increased from prior imaging of 5/22/2023.  No clear evidence of pulmonary embolism although patient factors  hinder the sensitivity of this study.  Tumor in the hilar regions  encases and distorts the pulmonary vessels and in the left hilum there  may be some direct invasion of pulmonary veins or pulmonary arteries.  Mildly increased markings in the lung bases, likely atelectasis.  I  cannot exclude mild basilar infiltrates.  Normal heart size.  Normal enhancement of the aorta.  Degenerative and postoperative changes in the spine with posterior  fusion hardware spanning T11-L2.  Stable compression fracture at L1.   Old-appearing bilateral rib  fractures.  New left hydronephrosis, likely due to tumor in the left side of the  pelvis which has increased markedly.  There is a nonobstructing  calculus in the lower pole of the left kidney but no ureteral calculi  are apparent.  There is a Mays catheter in the bladder and the bladder is  decompressed.  Abnormal diffuse thickening of the wall of the urinary  bladder.  Tumor in the left side of the pelvis encases the iliac vessels.  I  suspect the left iliac veins are occluded or significantly compressed  and there appears to be associated edema in the visualized left leg.  Signed by Garret Gomez MD  CT abdomen pelvis w IV contrast  Narrative: STUDY:  CT Angiogram of the Chest, CT Abdomen and Pelvis with IV Contrast;  12/2/2023 at 1:07 a.m.  INDICATION:  Abdominal pain medial to ostomy site.  Additional History:  Melanoma.  COMPARISON:  CT CAP 5/22/2023.  CTA chest 1/18/2021.  ACCESSION NUMBER(S):  [id number], [id number]  ORDERING CLINICIAN:  NINI KILPATRICK  TECHNIQUE:  CTA of the chest was performed following rapid injection  of intravenous contrast.  Images are reviewed and processed at a  workstation according to the CT angiogram protocol with 3-D and/or MIP  post processing imaging generated.  CT of the abdomen and pelvis was  performed with intravenous contrast.  Omnipaque 350 100 mL was  administered intravenously; positive oral contrast was given.  Automated mA/kV exposure control was utilized and patient examination  was performed in strict accordance with principles of ALARA.  FINDINGS:    CTA CHEST:  Adenopathy in the hilar regions distorts the pulmonary vessels.   Pulmonary arterial enhancement is inhomogeneous.  I do not see clear  evidence of pulmonary emboli.  In the region of the left hilum there  may be some tumor invading the pulmonary veins and possibly the  pulmonary arteries.  The thoracic aorta is normal in course and caliber without dissection  or aneurysm.  There is a vascular  access port over the right side of the chest.  The  catheter enters via a jugular approach and extends to the SVC.    The heart is normal in size without pericardial effusion.  Mildly  enlarged left axillary lymph nodes, new from prior.  Extensive  abnormal adenopathy in the chest, markedly increased from prior.  A  subcarinal lymph node which previously measured 3.1 x 2.3 cm currently  measures 4.6 x 4.3 cm and there are multiple additional enlarged  nodes, many of which are new from prior.  There is no pleural effusion, pleural thickening, or pneumothorax.    A right infrahilar mass which previously measured 3.9 x 3.8 cm  currently measures 4.8 x 4.6 cm.  The patient has innumerable  pulmonary nodules which have increased markedly from prior.  Bibasilar  pulmonary opacities, most suggestive of atelectasis.  ABDOMEN:     LIVER:  Markedly abnormal liver with multiple solid masses present, new and  enlarged from prior.  Detailed evaluation is hindered by artifact on  this scan but there are liver lesions measuring up to 12 cm present.     BILE DUCTS:  No intrahepatic or extrahepatic biliary ductal dilatation.     GALLBLADDER:  The gallbladder is is distended, but otherwise not grossly remarkable  in appearance.     STOMACH:  No abnormalities identified.     PANCREAS:  Heterogeneous decreased density in the region of the pancreatic head  and near the junction of the head and body.  This is difficult to  measure, but seems similar to the prior study.  This is nonspecific.   I cannot exclude tumor.  No ductal dilatation.     SPLEEN:  No splenomegaly or focal splenic lesion.     ADRENAL GLANDS:  There is a right adrenal mass which has increased in size and now  appears to be invading the adjacent liver.     KIDNEYS AND URETERS:  Kidneys are normal in size and location.  Bilateral renal cysts, left  greater than right.  Small nonobstructing calculus in the lower pole  of the left kidney.  No right hydronephrosis but there  appears to be  new mild left hydronephrosis.  The left ureter can be traced down into  an area of abnormal soft tissue, likely tumor, in the left side of the  pelvis which is probably obstructing the ureter.  I do not see a  definite ureteral calculus.     PELVIS:     BLADDER:  There is a Mays catheter in the bladder and the bladder is  decompressed.  The wall of the urinary bladder is diffusely thickened.     REPRODUCTIVE ORGANS:  No abnormalities identified.     BOWEL:  No abnormalities identified.     VESSELS:  No abnormalities identified.  Abdominal aorta is normal in caliber.      PERITONEUM/RETROPERITONEUM/LYMPH NODES:  No free fluid.  No pneumoperitoneum.  Numerous mildly enlarged retroperitoneal lymph nodes have increased in  size.  Numerous immediately enlarged iliac lymph nodes bilaterally,  left greater than right.  These are somewhat confluent and difficult  to measure.  Abnormal enlarged right inguinal lymph nodes have  increased from prior.     ABDOMINAL WALL:  There is abnormal soft tissue adjacent to the umbilicus which is  likely tumor, previously measuring 2.9 x 1.8 cm and currently  measuring 3.4 x 3.4 cm.  SOFT TISSUES:   There is a large irregular area of abnormal soft tissue density  occupying much of the left side of the pelvis.  This appears to be  tumor, significantly increased from prior.  This is likely occluding  the distal left ureter.  This encases the iliac vessels.  The iliac  arteries are probably patent.  I do not have sufficient contrast to  adequately evaluate the iliac veins but suspect the left iliac veins  are either occluded or significantly compressed.  There does appear to  be significant associated soft tissue swelling/edema in the visualized  left upper leg.  The patient also has extensive tumor in the left  inguinal region extending to the skin surface.  This has increased  markedly from prior.  There are probable mesenteric implants of tumor throughout  the  abdomen/pelvis, increased from prior.     BONES:  Old-appearing bilateral rib fractures.  Degenerative changes in the  spine.  Postoperative changes in the thoracolumbar spine with pedicle  screws and posterior fusion hardware present.  Postoperative changes  in the spine.  There are pedicle screws and posterior fusion rods  spanning T11-L2.  Alignment is unremarkable.  Stable compression of  the L1 vertebral body with mild posterior displacement of the  vertebral body narrowing the canal.  There has been posterior  decompressive surgery at this level.  No bony destructive lesions are  seen with certainty.  Impression: Extensive metastatic disease in the chest, abdomen, and pelvis,  markedly increased from prior imaging of 5/22/2023.  No clear evidence of pulmonary embolism although patient factors  hinder the sensitivity of this study.  Tumor in the hilar regions  encases and distorts the pulmonary vessels and in the left hilum there  may be some direct invasion of pulmonary veins or pulmonary arteries.  Mildly increased markings in the lung bases, likely atelectasis.  I  cannot exclude mild basilar infiltrates.  Normal heart size.  Normal enhancement of the aorta.  Degenerative and postoperative changes in the spine with posterior  fusion hardware spanning T11-L2.  Stable compression fracture at L1.   Old-appearing bilateral rib fractures.  New left hydronephrosis, likely due to tumor in the left side of the  pelvis which has increased markedly.  There is a nonobstructing  calculus in the lower pole of the left kidney but no ureteral calculi  are apparent.  There is a Mays catheter in the bladder and the bladder is  decompressed.  Abnormal diffuse thickening of the wall of the urinary  bladder.  Tumor in the left side of the pelvis encases the iliac vessels.  I  suspect the left iliac veins are occluded or significantly compressed  and there appears to be associated edema in the visualized left leg.  Signed  by aGrret Gomez MD  XR chest 1 view  Narrative: STUDY:  Chest Radiograph;  12/01/2023 at 11:48 PM  INDICATION:  Tachycardia.  COMPARISON:  XR chest 01/18/21, 12/19/20.  ACCESSION NUMBER(S):  [id number]  ORDERING CLINICIAN:  NINI KILPATRICK  TECHNIQUE:  Frontal chest was obtained at 2348 hours.  FINDINGS:  CARDIOMEDIASTINAL SILHOUETTE:  Cardiomediastinal silhouette is stable in size and configuration.   Right-sided chest port terminates in the distal SVC     LUNGS:  Diffuse nodular densities are identified throughout the lungs  suggesting metastatic disease.  More patchy airspace opacities are  also seen in the right perihilar and left midlung region suspicious  for superimposed infiltrates.  No large pleural effusion.  No  pneumothorax.     ABDOMEN:  No remarkable upper abdominal findings.     BONES:  No acute osseous changes.  Impression: Diffuse nodular densities throughout the lungs compatible with  metastatic disease.  In addition there are patchy airspace opacities  bilaterally suggesting superimposed infiltrates.  Signed by Italo Tejada MD      Physical Exam  Patient is lethargic, not in apparent distress  Eyes: PERRLA, no conjunctival congestion  Chest: Bilateral Air entry, no crackles or wheezing  Heart: s1S2 regular, no murmur  Abdomen: Soft, non tender, BS present,   Ext: Mild edema lower extremity, wound/lesion on left heel  Skin: Melanotic lesions on left thigh and abdomen wall  Relevant Results               Assessment/Plan   This patient currently has cardiac telemetry ordered; if you would like to modify or discontinue the telemetry order, click here to go to the orders activity to modify/discontinue the order.              Principal Problem:    Urinary tract infection without hematuria, site unspecified    #1 complicated UTI with left-sided hydronephrosis  Completed treatment with meropenem  Follow cultures-blood cultures and urine cultures negative so far.        #2 metastatic melanoma       Patient with extensive metastatic disease and has exhibited in patient's CT of the chest abdomen and pelvis as well as known brain mets.  Evaluated by oncology-feels patient has poor prognosis and recommended hospice care.  Hospice consulted-had a meeting with the family, family not ready for hospice yet.      #3 encephalopathy          #4  Anemia with thrombocytopenia      Possibly secondary to metastatic disease  DVT prophylaxis in form of SCDs only    #5 type 2 diabetes  C    #6.  Vitamin D deficiency  Oral supplementation    #7: Acute hypoxic respiratory failure-secondary to metastatic melanoma to the lungs.  Patient is currently on BiPAP.  Family wished for DNR with no intubation and no chest compression.  Hospice consulted for possible hospice transition and are following                   Danielle Bauman MD

## 2023-12-05 NOTE — CARE PLAN
The patient's goals for the shift include stephanie    The clinical goals for the shift include patient will remain comfortable.     Over the shift, the patient did not make progress toward the following goals. Barriers to progression include pain. Recommendations to address these barriers include monitor for signs of pain.

## 2023-12-05 NOTE — CARE PLAN
The patient's goals for the shift include stephanie    The clinical goals for the shift include maintain SPO2 >88% during this shift      Problem: Skin  Goal: Decreased wound size/increased tissue granulation at next dressing change  Outcome: Progressing  Goal: Participates in plan/prevention/treatment measures  Outcome: Progressing  Goal: Prevent/manage excess moisture  Outcome: Progressing  Goal: Prevent/minimize sheer/friction injuries  Outcome: Progressing  Goal: Promote/optimize nutrition  Outcome: Progressing  Goal: Promote skin healing  Outcome: Progressing     Problem: Pain  Goal: My pain/discomfort is manageable  Outcome: Progressing     Problem: Safety  Goal: Patient will be injury free during hospitalization  Outcome: Progressing  Goal: I will remain free of falls  Outcome: Progressing     Problem: Daily Care  Goal: Daily care needs are met  Outcome: Progressing     Problem: Psychosocial Needs  Goal: Demonstrates ability to cope with hospitalization/illness  Outcome: Progressing  Goal: Collaborate with me, my family, and caregiver to identify my specific goals  Outcome: Progressing     Problem: Discharge Barriers  Goal: My discharge needs are met  Outcome: Progressing

## 2023-12-05 NOTE — CONSULTS
Wound Care Consult     Visit Date: 12/5/2023      Patient Name: Reilly Altamirano         MRN: [id number]           YOB: 1955    Pertinent Labs:   Albumin   Date Value Ref Range Status   12/01/2023 3.2 (L) 3.4 - 5.0 g/dL Final   12/08/2021 3.7 3.4 - 5.0 g/dL Final     ALBUMIN (MG/L) IN URINE   Date Value Ref Range Status   10/07/2022 45.9 Not Established mg/L Final       Wound Assessment:  Wound 12/02/23 Pressure Injury Sacrum (Active)   Wound Image   12/05/23 0937   Site Assessment Purple;Red 12/05/23 0937   Bozena-Wound Assessment Maceration 12/05/23 0937   Pressure Injury Stage DTPI 12/05/23 0937   Shape irregular 12/05/23 0937   Wound Length (cm) 11 cm 12/05/23 0937   Wound Width (cm) 8 cm 12/05/23 0937   Wound Surface Area (cm^2) 88 cm^2 12/05/23 0937   Drainage Description Sanguineous 12/05/23 0937   Drainage Amount Small 12/05/23 0937   Dressing Foam 12/05/23 0937   Dressing Changed Changed 12/05/23 0937   Dressing Status Clean;Dry;Occlusive 12/05/23 0937       Wound 12/04/23 Other (comment) Heel Left (Active)   Wound Image   12/05/23 0943   Site Assessment Burgundy;Dry;Intact 12/05/23 0943   Bozena-Wound Assessment Yellow-brown (Hemosiderin staining) 12/05/23 0943   Non-staged Wound Description Partial thickness 12/05/23 0943   Shape irregular 12/05/23 0943   Wound Length (cm) 2.5 cm 12/05/23 0943   Wound Width (cm) 2 cm 12/05/23 0943   Wound Surface Area (cm^2) 5 cm^2 12/05/23 0943   Wound Bed Epithelium (%) 100 % 12/05/23 0943   Drainage Amount None 12/05/23 0943   Dressing Foam 12/05/23 0943   Dressing Changed Changed 12/05/23 0943   Dressing Status Clean;Dry;Occlusive 12/05/23 0943       Wound 12/05/23 Pressure Injury Heel Right;Medial (Active)   Wound Image   12/05/23 0945   Site Assessment Non-blanchable erythema 12/05/23 0945   Bozena-Wound Assessment Intact 12/05/23 0945   Pressure Injury Stage 1 12/05/23 0945   Wound Length (cm) 2 cm 12/05/23 0945   Wound Width (cm) 2 cm 12/05/23 0945   Wound  Health Maintenance Due   Topic Date Due   • Breast Cancer Screening  04/23/2019       Patient is due for topics as listed above but is not proceeding with Mammogram at this time.          Surface Area (cm^2) 4 cm^2 12/05/23 0945   Dressing Foam 12/05/23 0945   Dressing Changed Changed 12/05/23 0945   Dressing Status Dry;Clean;Occlusive 12/05/23 0945     Patient seen for report of pressure injuries (present on admission) complicated by PMH: extensive metastatic melanoma including mets to the brain, abdomen, and lungs. Exam conducted with bedside RN Nyla to assist with positioning. Patient son remained at bedside, reports patient “has had wound to butt from frequent hospitalizations and being in bed so much”. Patient was admitted from [facility], is bedbound per chart review. Patient was lethargic during exam, on bipap. Family considering hospice. 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 Small stage 1 noted to right heel. Cluster of suspected deep tissue injuries noted to sacrum with maceration within to right buttock.  Skin hygiene and dressing care provided. See detailed assessment above from flowsheet. Recommendations below, reviewed with Dr. Bauman, orders received.     Treatment protocol recommended:  Cleanse each area with normal saline and pat dry.  Apply mepilex border foams every 3 days/prn.  Continue to off load, turning at least every 2 hours. Heel boots.    Therapeutic surface: Patient on Versa Care AIR standard alternating pressure mattress during exam with turn and reposition system in place. Envision mattress (rental) on hospital bed frame recommended and ordered after exam by  per bedside RN. Staff to continue to turn and reposition atleast every 2 hours. Heel boots.     Nursing updated, continue pressure injury preventions, nursing to follow provider orders and re-consult wound RN if needed.    Please contact me with questions or changes in patient condition.  Ronna Nassar RN  [facility]   [phone]      Ronna Nassar RN  12/5/2023  10:50 AM

## 2023-12-05 NOTE — PROGRESS NOTES
" Hospice Nursing Note     Reilly Altamirano     Met with patient wife, Cathie and son, Douglas. Wife states she is still not ready for Hospice and does not want to stop BiPap as she believes she would be ending his life.   Dr. Bauman met with family and wife and son and discussed pt current status.  Pt sister in law pressuring Cathie to stop Bipap. Cathie stated that \"at the end of the day they will all go home and go back to their lives and I will be stuck with the memory of ending my 's life.\"  Had long discussion regarding what pt would have wanted. She stated that she would like pt go \"naturally\" meaning without her having to make decision to stop BiPap.    Lots of support given. Will have Hospice follow up tomorrow if pt still with us -    Thanks -  Kenzie Velarde RN  [facility]    "

## 2023-12-05 NOTE — PROGRESS NOTES
Spiritual Care Visit    Clinical Encounter Type  Visited With: Patient and family together  Routine Visit: Follow-up

## 2023-12-06 NOTE — CARE PLAN
Remains nonresponsive.  Family at bedside--agreeing for hospice--hospice nurse aware. Cont on bipap at this time.

## 2023-12-06 NOTE — DOCUMENTATION CLARIFICATION NOTE
"    PATIENT:               RUMA ALVAREZ  ACCT #:                  [id number]  MRN:                       [id number]  :                       1955  ADMIT DATE:       2023 9:01 PM  DISCH DATE:  RESPONDING PROVIDER #:        [id number]          PROVIDER RESPONSE TEXT:    Pneumonia ruled out after workup    CDI QUERY TEXT:    [facility]_Diagnosis Ruled Out In        Instruction:    Based on your assessment of the patient and the clinical information, please provide the requested documentation by clicking on the appropriate radio button and enter any additional information if prompted.    Question: Please further clarify the diagnosis of Pneumonia as    When answering this query, please exercise your independent professional judgment. The fact that a question is being asked, does not imply that any particular answer is desired or expected.    The patient's clinical indicators include:  Clinical Information: 67yo male admitted with UTI, hydronephrosis, hypotension, possible Sepsis, possible Pneumonia, encephalopathy, NAINA, and developed ARF hypoxic.    PMH includes UTI, DM neuropathy with left heel wound, Vit D def, extensive melanoma with mets to lung, abdomen, pelvis, bone, liver, lymph nodes, and brain.    Documented Diagnosis: Ed Dr Avelar : \"Final diagnosis and disposition Sepsis secondary to urinary tract infection and pneumonia\"    Clinical Indicators: Ed Dr Avelar: \"chest x- ray also appears to show his metastatic disease but also bilateral infiltrative process consistent with pneumonia.\"     Chest CT Angio : Extensive metastatic disease in the chest, abdomen, and pelvis, markedly increased from prior imaging of 2023. No clear evidence of pulmonary embolism although patient factors hinder the sensitivity of this study.  Tumor in the hilar regions encases and distorts the pulmonary vessels and in the left hilum there may be some direct invasion of pulmonary veins or pulmonary arteries. Mildly increased " markings in the lung bases, likely atelectasis.  I cannot exclude mild basilar infiltrates.    Treatment: Merrem, Vancomycin, Zosyn    Risk Factors: Recent hospitalization, current SNF resident, dysphagia, altered mental status, extensive cancer.  Options provided:  -- Pneumonia ruled out after workup  -- Pneumonia ruled in for this admission  -- Other - I will add my own diagnosis  -- Refer to Clinical Documentation Reviewer    Query created by: Jaja Dias on 12/4/2023 4:19 PM      Electronically signed by:  KENRICK QUAN MD 12/6/2023 9:40 AM

## 2023-12-06 NOTE — PROGRESS NOTES
Reilly Altamirano is a 68 y.o. male on day 4 of admission presenting with Urinary tract infection without hematuria, site unspecified.      Subjective      Reilly Altamirano is a 68 y.o. male with past medical history of extensive metastatic melanoma including mets to the brain abdomen lungs presented to the emergency room due to decreased mental status and for loss of IV access for treatment of UTI with meropenem.  Patient has just come to this area from treatment at [facility].  Patient is unable to provide me any information but he did just receive morphine in minutes before I saw him.  All information is from ER physician comments as well as old records available to us.  Apparently patient was recommended to pursue palliative care and/or hospice care from his treating physicians at [facility].  Patient has come searching for continued hope and treatment at another facility.  Patient had been seen actually at [facility] last week and started on Bactrim for a UTI and sent back to the nursing facility.  He was placed on meropenem and had PICC line put in place.  Patient became confused and accidentally pulled his central line and subsequently is being brought in for IV access and for further evaluation.  Patient had a telephone visit with Dr. Meier at [facility] to re-establish November 22.  Please refer to that note for details of patient's cancer diagnosis and treatment.  Upon arrival to the emergency room today patient's blood pressure was 96/78 pulse of 137.  Patient's was treated with IV fluid resuscitation with significant improvement of his blood pressure as well as his pulse.  Blood work shows glucose of 264 nonfasting state.  BMP significant for a bicarb of only 20 anion gap 16 with patient's lactic acid initially of 2.2 but after hydration 1.6.  BUN is 32 and creatinine of 1.15 with baseline creatinine approximately 1.0.  White count 7200 hemoglobin of  8.9 platelets of only 46,000.  Venous gases pH is 7.43 with pCO2 of 31.  Urinalysis is positive for protein glucose blood ketone leukocyte Estrace.  Patient has 11-20 RBCs 6-10 WBCs 2+ bacteria.  Imaging shows extensive adenopathy in the thoracic cavity.  Patient has enlarging nodes 4.6 x 4.3 cm in size right infrahilar mass measuring now 4.8 x 4.6 cm in size innumerable pulmonary nodules severely increased from previous studies.  Liver has marked abnormal liver with multiple solid masses present new and enlarged from previous studies.  Pancreas has a heterogeneous decreased density in the region of the pancreatic head near the junction of the head and the body.  Nonspecific but cannot exclude tumor.  Right adrenal mass increasing in size appears to be invading adjacent liver.  Patient has evidence of new mild left hydronephrosis with a left ureter traced down into an area of abnormal soft tissue likely a tumor left side of the pelvis which is probably obstructing the ureter.  Patient with large irregular area of abnormal soft tissue density occupying much of the left side of the pelvis most likely appears to be tumor significant increase from previous studies likely causing obstruction of the left distal ureter and encases the iliac vessels..  Patient is being admitted to the hospital for what appears to be potential of early sepsis from UTI with patient having evidence of obstructed left ureter with mild hydronephrosis.  Patient with quite extensive metastatic melanoma including mets to the brain liver lungs and pelvis.  Patient will be established with oncology for further recommendations and stabilization prior to following up with [facility] oncology.  Patient will be continued on meropenem for his UTI with supportive IV fluids supportive care otherwise.  Patient's active CODE STATUS is full code as family is not ready to accept palliative care at this present time.    Patient was admitted with a diagnosis of  complicated UTI with left-sided hydronephrosis-restarted started on IV meropenem.  Urology consulted    12/02: Patient was evaluated this a.m., pleasantly confused, does not seem to be any distress.  No fever or chills, no nausea or vomiting.  12/03: Patient remains lethargic but responds to simple questions.  Does not seem to be in respiratory distress.    12/04: Overnight patient developed respiratory distress requiring BiPAP.  This morning patient remains on BiPAP, unresponsive.  Family members at the bedside, ongoing discussion regarding transition to hospice  12/05: Patient remains on BiPAP- unresponsive.  Family not ready for hospice yet.  12/06: Patient was evaluated this a.m., family members at bedside.  Remains on BiPAP and unresponsive.  Family considering hospice and willing to sign consent.      Objective   Last Recorded Vitals  /67 (BP Location: Right arm, Patient Position: Lying)   Pulse 98   Temp 36.2 °C (97.1 °F) (Temporal)   Resp (!) 28   Wt 101 kg (223 lb 5.2 oz)   SpO2 90%   Intake/Output last 3 Shifts:    Intake/Output Summary (Last 24 hours) at 12/6/2023 1352  Last data filed at 12/6/2023 0547  Gross per 24 hour   Intake --   Output 250 ml   Net -250 ml         Admission Weight  Weight: 121 kg (266 lb 15.6 oz) (12/01/23 2125)    Daily Weight  12/06/23 : 101 kg (223 lb 5.2 oz)    Image Results  CT angio chest for pulmonary embolism  Narrative: STUDY:  CT Angiogram of the Chest, CT Abdomen and Pelvis with IV Contrast;  12/2/2023 at 1:07 a.m.  INDICATION:  Abdominal pain medial to ostomy site.  Additional History:  Melanoma.  COMPARISON:  CT CAP 5/22/2023.  CTA chest 1/18/2021.  ACCESSION NUMBER(S):  [id number], [id number]  ORDERING CLINICIAN:  NINI KILPATRICK  TECHNIQUE:  CTA of the chest was performed following rapid injection  of intravenous contrast.  Images are reviewed and processed at a  workstation according to the CT angiogram protocol with 3-D and/or MIP  post processing  imaging generated.  CT of the abdomen and pelvis was  performed with intravenous contrast.  Omnipaque 350 100 mL was  administered intravenously; positive oral contrast was given.  Automated mA/kV exposure control was utilized and patient examination  was performed in strict accordance with principles of ALARA.  FINDINGS:    CTA CHEST:  Adenopathy in the hilar regions distorts the pulmonary vessels.   Pulmonary arterial enhancement is inhomogeneous.  I do not see clear  evidence of pulmonary emboli.  In the region of the left hilum there  may be some tumor invading the pulmonary veins and possibly the  pulmonary arteries.  The thoracic aorta is normal in course and caliber without dissection  or aneurysm.  There is a vascular access port over the right side of the chest.  The  catheter enters via a jugular approach and extends to the SVC.    The heart is normal in size without pericardial effusion.  Mildly  enlarged left axillary lymph nodes, new from prior.  Extensive  abnormal adenopathy in the chest, markedly increased from prior.  A  subcarinal lymph node which previously measured 3.1 x 2.3 cm currently  measures 4.6 x 4.3 cm and there are multiple additional enlarged  nodes, many of which are new from prior.  There is no pleural effusion, pleural thickening, or pneumothorax.    A right infrahilar mass which previously measured 3.9 x 3.8 cm  currently measures 4.8 x 4.6 cm.  The patient has innumerable  pulmonary nodules which have increased markedly from prior.  Bibasilar  pulmonary opacities, most suggestive of atelectasis.  ABDOMEN:     LIVER:  Markedly abnormal liver with multiple solid masses present, new and  enlarged from prior.  Detailed evaluation is hindered by artifact on  this scan but there are liver lesions measuring up to 12 cm present.     BILE DUCTS:  No intrahepatic or extrahepatic biliary ductal dilatation.     GALLBLADDER:  The gallbladder is is distended, but otherwise not grossly  remarkable  in appearance.     STOMACH:  No abnormalities identified.     PANCREAS:  Heterogeneous decreased density in the region of the pancreatic head  and near the junction of the head and body.  This is difficult to  measure, but seems similar to the prior study.  This is nonspecific.   I cannot exclude tumor.  No ductal dilatation.     SPLEEN:  No splenomegaly or focal splenic lesion.     ADRENAL GLANDS:  There is a right adrenal mass which has increased in size and now  appears to be invading the adjacent liver.     KIDNEYS AND URETERS:  Kidneys are normal in size and location.  Bilateral renal cysts, left  greater than right.  Small nonobstructing calculus in the lower pole  of the left kidney.  No right hydronephrosis but there appears to be  new mild left hydronephrosis.  The left ureter can be traced down into  an area of abnormal soft tissue, likely tumor, in the left side of the  pelvis which is probably obstructing the ureter.  I do not see a  definite ureteral calculus.     PELVIS:     BLADDER:  There is a Mays catheter in the bladder and the bladder is  decompressed.  The wall of the urinary bladder is diffusely thickened.     REPRODUCTIVE ORGANS:  No abnormalities identified.     BOWEL:  No abnormalities identified.     VESSELS:  No abnormalities identified.  Abdominal aorta is normal in caliber.      PERITONEUM/RETROPERITONEUM/LYMPH NODES:  No free fluid.  No pneumoperitoneum.  Numerous mildly enlarged retroperitoneal lymph nodes have increased in  size.  Numerous immediately enlarged iliac lymph nodes bilaterally,  left greater than right.  These are somewhat confluent and difficult  to measure.  Abnormal enlarged right inguinal lymph nodes have  increased from prior.     ABDOMINAL WALL:  There is abnormal soft tissue adjacent to the umbilicus which is  likely tumor, previously measuring 2.9 x 1.8 cm and currently  measuring 3.4 x 3.4 cm.  SOFT TISSUES:   There is a large irregular area of  abnormal soft tissue density  occupying much of the left side of the pelvis.  This appears to be  tumor, significantly increased from prior.  This is likely occluding  the distal left ureter.  This encases the iliac vessels.  The iliac  arteries are probably patent.  I do not have sufficient contrast to  adequately evaluate the iliac veins but suspect the left iliac veins  are either occluded or significantly compressed.  There does appear to  be significant associated soft tissue swelling/edema in the visualized  left upper leg.  The patient also has extensive tumor in the left  inguinal region extending to the skin surface.  This has increased  markedly from prior.  There are probable mesenteric implants of tumor throughout the  abdomen/pelvis, increased from prior.     BONES:  Old-appearing bilateral rib fractures.  Degenerative changes in the  spine.  Postoperative changes in the thoracolumbar spine with pedicle  screws and posterior fusion hardware present.  Postoperative changes  in the spine.  There are pedicle screws and posterior fusion rods  spanning T11-L2.  Alignment is unremarkable.  Stable compression of  the L1 vertebral body with mild posterior displacement of the  vertebral body narrowing the canal.  There has been posterior  decompressive surgery at this level.  No bony destructive lesions are  seen with certainty.  Impression: Extensive metastatic disease in the chest, abdomen, and pelvis,  markedly increased from prior imaging of 5/22/2023.  No clear evidence of pulmonary embolism although patient factors  hinder the sensitivity of this study.  Tumor in the hilar regions  encases and distorts the pulmonary vessels and in the left hilum there  may be some direct invasion of pulmonary veins or pulmonary arteries.  Mildly increased markings in the lung bases, likely atelectasis.  I  cannot exclude mild basilar infiltrates.  Normal heart size.  Normal enhancement of the aorta.  Degenerative and  postoperative changes in the spine with posterior  fusion hardware spanning T11-L2.  Stable compression fracture at L1.   Old-appearing bilateral rib fractures.  New left hydronephrosis, likely due to tumor in the left side of the  pelvis which has increased markedly.  There is a nonobstructing  calculus in the lower pole of the left kidney but no ureteral calculi  are apparent.  There is a Mays catheter in the bladder and the bladder is  decompressed.  Abnormal diffuse thickening of the wall of the urinary  bladder.  Tumor in the left side of the pelvis encases the iliac vessels.  I  suspect the left iliac veins are occluded or significantly compressed  and there appears to be associated edema in the visualized left leg.  Signed by Garret Gomez MD  CT abdomen pelvis w IV contrast  Narrative: STUDY:  CT Angiogram of the Chest, CT Abdomen and Pelvis with IV Contrast;  12/2/2023 at 1:07 a.m.  INDICATION:  Abdominal pain medial to ostomy site.  Additional History:  Melanoma.  COMPARISON:  CT CAP 5/22/2023.  CTA chest 1/18/2021.  ACCESSION NUMBER(S):  [id number], [id number]  ORDERING CLINICIAN:  NINI KILPATRICK  TECHNIQUE:  CTA of the chest was performed following rapid injection  of intravenous contrast.  Images are reviewed and processed at a  workstation according to the CT angiogram protocol with 3-D and/or MIP  post processing imaging generated.  CT of the abdomen and pelvis was  performed with intravenous contrast.  Omnipaque 350 100 mL was  administered intravenously; positive oral contrast was given.  Automated mA/kV exposure control was utilized and patient examination  was performed in strict accordance with principles of ALARA.  FINDINGS:    CTA CHEST:  Adenopathy in the hilar regions distorts the pulmonary vessels.   Pulmonary arterial enhancement is inhomogeneous.  I do not see clear  evidence of pulmonary emboli.  In the region of the left hilum there  may be some tumor invading the pulmonary  veins and possibly the  pulmonary arteries.  The thoracic aorta is normal in course and caliber without dissection  or aneurysm.  There is a vascular access port over the right side of the chest.  The  catheter enters via a jugular approach and extends to the SVC.    The heart is normal in size without pericardial effusion.  Mildly  enlarged left axillary lymph nodes, new from prior.  Extensive  abnormal adenopathy in the chest, markedly increased from prior.  A  subcarinal lymph node which previously measured 3.1 x 2.3 cm currently  measures 4.6 x 4.3 cm and there are multiple additional enlarged  nodes, many of which are new from prior.  There is no pleural effusion, pleural thickening, or pneumothorax.    A right infrahilar mass which previously measured 3.9 x 3.8 cm  currently measures 4.8 x 4.6 cm.  The patient has innumerable  pulmonary nodules which have increased markedly from prior.  Bibasilar  pulmonary opacities, most suggestive of atelectasis.  ABDOMEN:     LIVER:  Markedly abnormal liver with multiple solid masses present, new and  enlarged from prior.  Detailed evaluation is hindered by artifact on  this scan but there are liver lesions measuring up to 12 cm present.     BILE DUCTS:  No intrahepatic or extrahepatic biliary ductal dilatation.     GALLBLADDER:  The gallbladder is is distended, but otherwise not grossly remarkable  in appearance.     STOMACH:  No abnormalities identified.     PANCREAS:  Heterogeneous decreased density in the region of the pancreatic head  and near the junction of the head and body.  This is difficult to  measure, but seems similar to the prior study.  This is nonspecific.   I cannot exclude tumor.  No ductal dilatation.     SPLEEN:  No splenomegaly or focal splenic lesion.     ADRENAL GLANDS:  There is a right adrenal mass which has increased in size and now  appears to be invading the adjacent liver.     KIDNEYS AND URETERS:  Kidneys are normal in size and location.   Bilateral renal cysts, left  greater than right.  Small nonobstructing calculus in the lower pole  of the left kidney.  No right hydronephrosis but there appears to be  new mild left hydronephrosis.  The left ureter can be traced down into  an area of abnormal soft tissue, likely tumor, in the left side of the  pelvis which is probably obstructing the ureter.  I do not see a  definite ureteral calculus.     PELVIS:     BLADDER:  There is a Mays catheter in the bladder and the bladder is  decompressed.  The wall of the urinary bladder is diffusely thickened.     REPRODUCTIVE ORGANS:  No abnormalities identified.     BOWEL:  No abnormalities identified.     VESSELS:  No abnormalities identified.  Abdominal aorta is normal in caliber.      PERITONEUM/RETROPERITONEUM/LYMPH NODES:  No free fluid.  No pneumoperitoneum.  Numerous mildly enlarged retroperitoneal lymph nodes have increased in  size.  Numerous immediately enlarged iliac lymph nodes bilaterally,  left greater than right.  These are somewhat confluent and difficult  to measure.  Abnormal enlarged right inguinal lymph nodes have  increased from prior.     ABDOMINAL WALL:  There is abnormal soft tissue adjacent to the umbilicus which is  likely tumor, previously measuring 2.9 x 1.8 cm and currently  measuring 3.4 x 3.4 cm.  SOFT TISSUES:   There is a large irregular area of abnormal soft tissue density  occupying much of the left side of the pelvis.  This appears to be  tumor, significantly increased from prior.  This is likely occluding  the distal left ureter.  This encases the iliac vessels.  The iliac  arteries are probably patent.  I do not have sufficient contrast to  adequately evaluate the iliac veins but suspect the left iliac veins  are either occluded or significantly compressed.  There does appear to  be significant associated soft tissue swelling/edema in the visualized  left upper leg.  The patient also has extensive tumor in the left  inguinal  region extending to the skin surface.  This has increased  markedly from prior.  There are probable mesenteric implants of tumor throughout the  abdomen/pelvis, increased from prior.     BONES:  Old-appearing bilateral rib fractures.  Degenerative changes in the  spine.  Postoperative changes in the thoracolumbar spine with pedicle  screws and posterior fusion hardware present.  Postoperative changes  in the spine.  There are pedicle screws and posterior fusion rods  spanning T11-L2.  Alignment is unremarkable.  Stable compression of  the L1 vertebral body with mild posterior displacement of the  vertebral body narrowing the canal.  There has been posterior  decompressive surgery at this level.  No bony destructive lesions are  seen with certainty.  Impression: Extensive metastatic disease in the chest, abdomen, and pelvis,  markedly increased from prior imaging of 5/22/2023.  No clear evidence of pulmonary embolism although patient factors  hinder the sensitivity of this study.  Tumor in the hilar regions  encases and distorts the pulmonary vessels and in the left hilum there  may be some direct invasion of pulmonary veins or pulmonary arteries.  Mildly increased markings in the lung bases, likely atelectasis.  I  cannot exclude mild basilar infiltrates.  Normal heart size.  Normal enhancement of the aorta.  Degenerative and postoperative changes in the spine with posterior  fusion hardware spanning T11-L2.  Stable compression fracture at L1.   Old-appearing bilateral rib fractures.  New left hydronephrosis, likely due to tumor in the left side of the  pelvis which has increased markedly.  There is a nonobstructing  calculus in the lower pole of the left kidney but no ureteral calculi  are apparent.  There is a Mays catheter in the bladder and the bladder is  decompressed.  Abnormal diffuse thickening of the wall of the urinary  bladder.  Tumor in the left side of the pelvis encases the iliac vessels.  I  suspect  the left iliac veins are occluded or significantly compressed  and there appears to be associated edema in the visualized left leg.  Signed by Garret Gomez MD  XR chest 1 view  Narrative: STUDY:  Chest Radiograph;  12/01/2023 at 11:48 PM  INDICATION:  Tachycardia.  COMPARISON:  XR chest 01/18/21, 12/19/20.  ACCESSION NUMBER(S):  [id number]  ORDERING CLINICIAN:  NINI KILPATRICK  TECHNIQUE:  Frontal chest was obtained at 2348 hours.  FINDINGS:  CARDIOMEDIASTINAL SILHOUETTE:  Cardiomediastinal silhouette is stable in size and configuration.   Right-sided chest port terminates in the distal SVC     LUNGS:  Diffuse nodular densities are identified throughout the lungs  suggesting metastatic disease.  More patchy airspace opacities are  also seen in the right perihilar and left midlung region suspicious  for superimposed infiltrates.  No large pleural effusion.  No  pneumothorax.     ABDOMEN:  No remarkable upper abdominal findings.     BONES:  No acute osseous changes.  Impression: Diffuse nodular densities throughout the lungs compatible with  metastatic disease.  In addition there are patchy airspace opacities  bilaterally suggesting superimposed infiltrates.  Signed by Italo Tejada MD      Physical Exam  Patient is lethargic, not in apparent distress  Eyes: PERRLA, no conjunctival congestion  Chest: Bilateral Air entry, no crackles or wheezing  Heart: s1S2 regular, no murmur  Abdomen: Soft, non tender, BS present,   Ext: Mild edema lower extremity, wound/lesion on left heel  Skin: Melanotic lesions on left thigh and abdomen wall  Relevant Results               Assessment/Plan   This patient currently has cardiac telemetry ordered; if you would like to modify or discontinue the telemetry order, click here to go to the orders activity to modify/discontinue the order.              Principal Problem:    Urinary tract infection without hematuria, site unspecified    #1 complicated UTI with left-sided  hydronephrosis  Completed treatment with meropenem  Blood culture and urine culture has remained negative        #2 metastatic melanoma      Patient with extensive metastatic disease and has exhibited in patient's CT of the chest abdomen and pelvis as well as known brain mets.  Evaluated by oncology-feels patient has poor prognosis and recommended hospice care.  Hospice consulted-had a meeting with the family, initially family were reluctant and signing hospice, today they are agreeing on hospice transition.  Patient will be admitted to [facility] once family meets with hospice and sign consent.     #3 encephalopathy          #4  Anemia with thrombocytopenia      Possibly secondary to metastatic disease  DVT prophylaxis in form of SCDs only    #5 type 2 diabetes  C    #6.  Vitamin D deficiency  Oral supplementation    #7: Acute hypoxic respiratory failure-secondary to metastatic melanoma to the lungs.  Patient is currently on BiPAP.  Family wished for DNR with no intubation and no chest compression.  Hospice consulted for possible hospice transition and are following                   Danielle Bauman MD

## 2023-12-06 NOTE — DOCUMENTATION CLARIFICATION NOTE
"    PATIENT:               RUMA ALVAREZ  ACCT #:                  [id number]  MRN:                       [id number]  :                       1955  ADMIT DATE:       2023 9:01 PM  DISCH DATE:  RESPONDING PROVIDER #:        [id number]          PROVIDER RESPONSE TEXT:    Sepsis was a differential diagnosis and ruled out after study    CDI QUERY TEXT:    UH_CV Sepsis      Instruction:  Based on your assessment of the patient and the clinical information, please provide the requested documentation by clicking on the appropriate radio button and enter any additional information if prompted.    Question: Sepsis was documented in the medical record. Based on the documentation and the clinical information, can the diagnosis be further clarified as    When answering this query, please exercise your independent professional judgment. The fact that a question is being asked, does not imply that any particular answer is desired or expected.    The patient's clinical indicators include:  Clinical Information: 67yo male admitted with UTI, hydronephrosis, hypotension, possible Sepsis, possible Pneumonia, encephalopathy, NAINA, and developed ARF hypoxic.    PMH includes UTI, DM neuropathy with left heel wound, Vit D def, extensive melanoma with mets to lung, abdomen, pelvis, bone, liver, lymph nodes, and brain.    Documented Diagnosis: Ed Dr Avelar : \"Final diagnosis and disposition Sepsis secondary to urinary tract infection and pneumonia\"    Clinical Indicators: All results from  unless otherwise noted.  -Vital Signs on arrival to ED: 97.7, 137, 19, 96/78, 96 percent O2 sat  -WBC: 7.2  - Urine culture : no growth  -Neutrophil Count/percent Neutrophil:  4.93 / 68 percent  -Lactic acid: 2.2  -BUN/Creat:  32/ 1.15 increased to Bun 54/ Cr 2.00 on   -Blood cultures: NGTD  -Bilirubin: 1.0  -MAP:  84  -Montgomery Coma Scale: 14 confused  -PAO2/FIO2: over 300 on arrival  -Procalcitonin: not tested  -Platelets: 46  -Other " clinical indicators: hgb 8.9, bs 264    Treatment: 2 Liter NS IVF bolus, Vancomycin, Zosyn, Merrem.    Risk Factors: UTI, possible pneumonia, numerous skin lesions.  Options provided:  -- Sepsis was a differential diagnosis and ruled out after study  -- Sepsis with neurological organ dysfunction of metabolic encephalopathy  -- Sepsis with multi-system organ dysfunction of acute respiratory failure, acute metabolic encephalopathy, thrombocytopenia, lactic acidosis, NAINA  -- Sepsis with other organ dysfunction, Please specify additional information below  -- Other - I will add my own diagnosis  -- Refer to Clinical Documentation Reviewer    Query created by: Jaja Dias on 12/4/2023 4:12 PM      Electronically signed by:  KENRICK QUAN MD 12/6/2023 9:40 AM

## 2023-12-06 NOTE — DOCUMENTATION CLARIFICATION NOTE
"    PATIENT:               RUMA ALVAREZ  ACCT #:                  [id number]  MRN:                       [id number]  :                       1955  ADMIT DATE:       2023 9:01 PM  DISCH DATE:  RESPONDING PROVIDER #:        [id number]          PROVIDER RESPONSE TEXT:    UTI related to chronic indwelling rico catheter    CDI QUERY TEXT:    UH_UTI due to Urinary Device POA      Instruction:    Based on your assessment of the patient and the clinical information, please provide the requested documentation by clicking on the appropriate radio button and enter any additional information if prompted.    Question: Please further clarify the relationship between the UTI and the urinary device    When answering this query, please exercise your independent professional judgment. The fact that a question is being asked, does not imply that any particular answer is desired or expected.    The patient's clinical indicators include:  Clinical Information:  69yo male admitted with UTI, hydronephrosis, hypotension, possible Sepsis, possible Pneumonia, encephalopathy, NAINA, and developed ARF hypoxic.    PMH includes UTI, DM neuropathy with left heel wound, Vit D def, extensive melanoma with mets to lung, abdomen, pelvis, bone, liver, lymph nodes, and brain.    Clinical Indicators: Ed Dr Avelar: \" has a chronic indwelling Rico catheter which he recently started on IV antibiotics \"    - 23 22:18  Specific Gravity, Urine: over 1.030  Urobilinogen, Urine: 8  Hyaline Casts, Urine: 1+  Mucus, Urine: 1+  Ketones, Urine: 5  Blood, Urine: 1.0  Glucose, Urine: 70  Bilirubin, Urine: 0.5  Protein, Urine: 300  Nitrite, Urine: NEGATIVE  Leukocyte Esterase, Urine: 75 Neal/?L  pH, Urine: 5.5  Appearance, Urine: Cloudy  Color, Urine: Red  Bacteria, Urine: 2+  Uric Acid Crystals, Urine: 1+  RBC, Urine: 11-20  WBC, Urine: 6-10    - Urology Dr Ramos : \"Left hydronephrosis- mild/asymptomatic. UTI. Plan : Conservative management for left " "hydronephrosis. Continue Rico catheter. Monitor urine output and renal function. No  intervention at this point\"    - CT AP 12/2: Kidneys are normal in size and location. Bilateral renal cysts, left greater than right. Small nonobstructing calculus in the lower pole of the left kidney. No right hydronephrosis but there appears to be new mild left hydronephrosis. The left ureter can be traced down into an area of abnormal soft tissue, likely tumor, in the left side of the pelvis which is probably obstructing the ureter. I do not see a definite ureteral calculus.    - 12/2 Dr Bauman: \"Patient was admitted with a diagnosis of complicated UTI with left-sided hydronephrosis-restarted on IV meropenem.    Treatment: Merrem, Vancomycin, Zosyn, rico exchanged, Urology consult.    Risk Factors: Extensive cancer, chronic indwelling rico catheter.  Options provided:  -- UTI related to chronic indwelling rico catheter  -- UTI unrelated to chronic indwelling rico catheter  -- Other - I will add my own diagnosis  -- Refer to Clinical Documentation Reviewer    Query created by: Jaja Dias on 12/4/2023 4:34 PM      Electronically signed by:  KENRICK BAUMAN MD 12/6/2023 9:40 AM          "

## 2023-12-06 NOTE — PROGRESS NOTES
Speech-Language Pathology                 Therapy Communication Note    Patient Name: Reilly Altamirano  MRN: [id number]  Today's Date: 12/6/2023     Discipline: Speech Language Pathology    Missed Visit Reason: Missed Time Reason:  (Pt medical condition declining.)    Missed Time: Attempt    Comment:  Family has decided on hospice and awaiting hospice RN to sign consent.  Pt is on BiPAP and currently unresponsive.     Discontinue SLP services at this time.

## 2023-12-06 NOTE — CARE PLAN
The patient's goals for the shift include stephanie    The clinical goals for the shift include Pt will maintain SPO2 greater than 90%.     Over the shift, the patient did not make progress toward the following goals. Barriers to progression include dyspnea. Recommendations to address these barriers include oxygen therapy.

## 2023-12-06 NOTE — PROGRESS NOTES
Notified by Dr Bauman that patient's family may be interested in an autopsy once patient expires. Patient is a former pro football player and family is interested to know if patient has a CTE. I contacted the Mortician's office at [facility] ([phone]) and spoke with Bessy. Per Bessy, patient's brain can be removed at [facility], but would be sent out to a research lab for examination. After discussion with Neuropathologist, Bessy informed me of the process. Family will need to reach out to the [facility] C[facility] [email or web address] and discuss with this agency to determine if patient is a candidate for the program. If he is a candidate and family decides to pursue, patient would be moved from the [facility] morgue to [facility]'s morgue. [facility] would do the brain removal and then would send to the research center for examination. This information was reviewed with Dr Bauman, who will pass along the information to patient's family. Will need to update the [facility] Mortician's office if family elects to pursue this option. Care Transitions team is following.

## 2023-12-07 NOTE — DISCHARGE SUMMARY
DISCHARGE SUMMARY     Discharge Diagnosis  Urinary tract infection without hematuria, site unspecified      This discharge took greater than 35 minutes.    Test Results Pending At Discharge  Pending Labs       No current pending labs.            Hospital Course  Reilly Altamirano is a 68 y.o. male with past medical history of extensive metastatic melanoma including mets to the brain abdomen lungs presented to the emergency room due to decreased mental status and for loss of IV access for treatment of UTI with meropenem.  Patient has just come to this area from treatment at [facility].  Patient is unable to provide me any information but he did just receive morphine in minutes before I saw him.  All information is from ER physician comments as well as old records available to us.  Apparently patient was recommended to pursue palliative care and/or hospice care from his treating physicians at [facility].  Patient has come searching for continued hope and treatment at another facility.  Patient had been seen actually at [facility] last week and started on Bactrim for a UTI and sent back to the nursing facility.  He was placed on meropenem and had PICC line put in place.  Patient became confused and accidentally pulled his central line and subsequently is being brought in for IV access and for further evaluation.  Patient had a telephone visit with Dr. Meier at [facility] to re-establish November 22.  Please refer to that note for details of patient's cancer diagnosis and treatment.  Upon arrival to the emergency room today patient's blood pressure was 96/78 pulse of 137.  Patient's was treated with IV fluid resuscitation with significant improvement of his blood pressure as well as his pulse.  Blood work shows glucose of 264 nonfasting state.  BMP significant for a bicarb of only 20 anion gap 16 with patient's lactic acid initially of 2.2 but after hydration 1.6.   BUN is 32 and creatinine of 1.15 with baseline creatinine approximately 1.0.  White count 7200 hemoglobin of 8.9 platelets of only 46,000.  Venous gases pH is 7.43 with pCO2 of 31.  Urinalysis is positive for protein glucose blood ketone leukocyte Estrace.  Patient has 11-20 RBCs 6-10 WBCs 2+ bacteria.  Imaging shows extensive adenopathy in the thoracic cavity.  Patient has enlarging nodes 4.6 x 4.3 cm in size right infrahilar mass measuring now 4.8 x 4.6 cm in size innumerable pulmonary nodules severely increased from previous studies.  Liver has marked abnormal liver with multiple solid masses present new and enlarged from previous studies.  Pancreas has a heterogeneous decreased density in the region of the pancreatic head near the junction of the head and the body.  Nonspecific but cannot exclude tumor.  Right adrenal mass increasing in size appears to be invading adjacent liver.  Patient has evidence of new mild left hydronephrosis with a left ureter traced down into an area of abnormal soft tissue likely a tumor left side of the pelvis which is probably obstructing the ureter.  Patient with large irregular area of abnormal soft tissue density occupying much of the left side of the pelvis most likely appears to be tumor significant increase from previous studies likely causing obstruction of the left distal ureter and encases the iliac vessels..  Patient is being admitted to the hospital for what appears to be potential of early sepsis from UTI with patient having evidence of obstructed left ureter with mild hydronephrosis.  Patient with quite extensive metastatic melanoma including mets to the brain liver lungs and pelvis.  Patient will be established with oncology for further recommendations and stabilization prior to following up with [facility] oncology.  Patient will be continued on meropenem for his UTI with supportive IV fluids supportive care otherwise.  Patient's active CODE STATUS is full code as  family is not ready to accept palliative care at this present time.     Patient was admitted with a diagnosis of complicated UTI with left-sided hydronephrosis-restarted started on IV meropenem.  Urology consulted     12/02: Patient was evaluated this a.m., pleasantly confused, does not seem to be any distress.  No fever or chills, no nausea or vomiting.  12/03: Patient remains lethargic but responds to simple questions.  Does not seem to be in respiratory distress.     12/04: Overnight patient developed respiratory distress requiring BiPAP.  This morning patient remains on BiPAP, unresponsive.  Family members at the bedside, ongoing discussion regarding transition to hospice  12/05: Patient remains on BiPAP- unresponsive.  Family not ready for hospice yet.  12/06: Patient was evaluated this a.m., family members at bedside.  Remains on BiPAP and unresponsive.  Family considering hospice and willing to sign consent.    Per Dr Fry:       1.  Notified by respiratory therapy about family wanting to remove the BiPAP  2.  Please see initial H&P and subsequent progress note with initial presentation and management thus far.  3.  Patient has shown a progressive decline from a respiratory minimally if any responsive.  4.  ED physician did discuss case with the family extensively quite elected to proceed with hospice.  They are worsening.  Anxiolytics.  5.  Upon arrival to room the hospice social worker was talking to the patient's family with plans for a hospice nurse to transition to [facility] tomorrow.  6.  Extensive discussion with family regarding removal of BiPAP and his management thus far from presentation till now.  7.  Prior to me approaching further goals of care discussion the family had stated that they wanted to solely focus on comfort and wanted to initiate more aggressive comfort measures tonight.  8.  They had had elected to change his CODE STATUS to a full DO NOT RESUSCITATE comfort care only  9.  End-of-life  order set was utilized with as needed anxiolytics, analgesics, antipyretics and antisecretory medications.  10.  Hospice social worker had stated that she would follow-up in the morning regarding emergent need for further hospice services.         Per Dr Fry:     Patient pronounced dead at 4:25am on 23 .     #1 complicated UTI with left-sided hydronephrosis  #2 metastatic melanoma  #3 encephalopathy  #4  Anemia with thrombocytopenia  #5 type 2 diabetes  #6.  Vitamin D deficiency  #7: Acute hypoxic respiratory failure      Pertinent Physical Exam At Time of Discharge       Home Medications     Medication List      ASK your doctor about these medications     acyclovir 800 mg tablet; Commonly known as: Zovirax   amLODIPine 10 mg tablet; Commonly known as: Norvasc; TAKE ONE TABLET BY   MOUTH DAILY   buprenorphine 20 mcg/hour patch; Commonly known as: Butrans   calcium citrate-vitamin D3 315 mg-6.25 mcg (250 unit) tablet   camphor-menthoL lotion; Commonly known as: Sarna   carvedilol 6.25 mg tablet; Commonly known as: Coreg   cyanocobalamin 1,000 mcg tablet; Commonly known as: Vitamin B-12; Ask   about: Which instructions should I use?   dapsone 100 mg tablet   dexAMETHasone 2 mg tablet; Commonly known as: Decadron   diphenhydrAMINE 25 mg tablet; Commonly known as: Sominex   * DULoxetine 30 mg DR capsule; Commonly known as: Cymbalta; TAKE ONE   CAPSULE BY MOUTH DAILY. TAKE WITH 60MG DOSE FOR TOTAL OF 90MG DAILY   * DULoxetine 60 mg DR capsule; Commonly known as: Cymbalta; Take 1   capsule (60 mg) by mouth once daily.   fluticasone 50 mcg/actuation nasal spray; Commonly known as: Flonase   folic acid 1 mg tablet; Commonly known as: Folvite   haloperidol 2 mg tablet; Commonly known as: Haldol   hydrOXYzine HCL 10 mg tablet; Commonly known as: Atarax   ibuprofen 600 mg tablet   insulin glargine 100 unit/mL injection; Commonly known as: Lantus   insulin regular 100 unit/mL injection; Commonly known as: HumuLIN  R   isosorbide mononitrate ER 30 mg 24 hr tablet; Commonly known as: Imdur;   Take 1 tablet (30 mg) by mouth once daily in the morning. Take before   meals.   letermovir 480 mg tablet; Commonly known as: Prevymis   levETIRAcetam 500 mg tablet; Commonly known as: Keppra   lidocaine 5 % patch; Commonly known as: Lidoderm   magnesium oxide 400 mg (241.3 mg magnesium) tablet; Commonly known as:   Mag-Ox; TAKE ONE TABLET BY MOUTH DAILY   melatonin 3 mg tablet   meropenem 1 gram injection; Commonly known as: Merrem   sodium bicarbonate 650 mg tablet   sulfamethoxazole-trimethoprim 800-160 mg tablet; Commonly known as:   Bactrim DS   tamsulosin 0.4 mg 24 hr capsule; Commonly known as: Flomax   triamcinolone 0.1 % cream; Commonly known as: Kenalog   zinc sulfate 220 (50 Zn) MG capsule; Commonly known as: Zincate  * This list has 2 medication(s) that are the same as other medications   prescribed for you. Read the directions carefully, and ask your doctor or   other care provider to review them with you.       Outpatient Follow-Up  No follow-ups on file.     Kobi Celaya MD PhD  12/7/2023  7:32 AM

## 2023-12-07 NOTE — TELEPHONE ENCOUNTER
Wife called stating she would like a call back from RN when asked the reason she stated just have her call me back regarding my .

## 2023-12-07 NOTE — NURSING NOTE
Patient surrounded by family at bedside. Page sent to Dr. Fry and he called time of death [id number]. No belongings to be sent with family.

## 2023-12-07 NOTE — SIGNIFICANT EVENT
Preliminary Cause of Death:  · Date and Time of Death DATE: 12/07/23 / TIME: 4:25am    · Preliminary Cause of Death multi-system organ failure / Cardiopulmonary Arrest / Metastatic Melanoma / UTI   · Comments    Paged by nursing that patient became asystole on the monitor. On physical exam, patient was pale without spontaneous movements and did not respond to physical or verbal stimuli. There was no response to name or painful nailbed pressure. Radial pulses absent, palpated fpr >60 seconds. There were absent cardiac and lung sounds on all ausculatory fields. Pupils were fixed, dilated, and nonreactive bilaterally. Absent Gag and Corneal reflexes.    Patient pronounced dead at 4:25am on 12/07/23 .    Family was at bedside and condolences provided.     Bairon Fry, DO

## 2023-12-07 NOTE — SIGNIFICANT EVENT
1.  Notified by respiratory therapy about family wanting to remove the BiPAP  2.  Please see initial H&P and subsequent progress note with initial presentation and management thus far.  3.  Patient has shown a progressive decline from a respiratory minimally if any responsive.  4.  ED physician did discuss case with the family extensively quite elected to proceed with hospice.  They are worsening.  Anxiolytics.  5.  Upon arrival to room the hospice social worker was talking to the patient's family with plans for a hospice nurse to transition to [facility] tomorrow.  6.  Extensive discussion with family regarding removal of BiPAP and his management thus far from presentation till now.  7.  Prior to me approaching further goals of care discussion the family had stated that they wanted to solely focus on comfort and wanted to initiate more aggressive comfort measures tonight.  8.  They had had elected to change his CODE STATUS to a full DO NOT RESUSCITATE comfort care only  9.  End-of-life order set was utilized with as needed anxiolytics, analgesics, antipyretics and antisecretory medications.  10.  Hospice social worker had stated that she would follow-up in the morning regarding emergent need for further hospice services.

## 2023-12-14 ENCOUNTER — APPOINTMENT (OUTPATIENT)
Dept: PRIMARY CARE | Facility: CLINIC | Age: 68
End: 2023-12-14
Payer: MEDICARE

## 2023-12-20 ENCOUNTER — APPOINTMENT (OUTPATIENT)
Dept: HEMATOLOGY/ONCOLOGY | Facility: CLINIC | Age: 68
End: 2023-12-20
Payer: MEDICARE